# Patient Record
Sex: FEMALE | Employment: OTHER | URBAN - METROPOLITAN AREA
[De-identification: names, ages, dates, MRNs, and addresses within clinical notes are randomized per-mention and may not be internally consistent; named-entity substitution may affect disease eponyms.]

---

## 2019-03-28 ENCOUNTER — APPOINTMENT (OUTPATIENT)
Dept: GENERAL RADIOLOGY | Age: 64
End: 2019-03-28
Payer: COMMERCIAL

## 2019-03-28 ENCOUNTER — HOSPITAL ENCOUNTER (EMERGENCY)
Age: 64
Discharge: HOME | End: 2019-03-28
Payer: COMMERCIAL

## 2019-03-28 VITALS
RESPIRATION RATE: 15 BRPM | HEART RATE: 87 BPM | WEIGHT: 140 LBS | TEMPERATURE: 98.2 F | SYSTOLIC BLOOD PRESSURE: 95 MMHG | HEIGHT: 60 IN | DIASTOLIC BLOOD PRESSURE: 68 MMHG | OXYGEN SATURATION: 94 % | BODY MASS INDEX: 27.48 KG/M2

## 2019-03-28 DIAGNOSIS — N30.00 ACUTE CYSTITIS WITHOUT HEMATURIA: Primary | ICD-10-CM

## 2019-03-28 DIAGNOSIS — R17 ELEVATED BILIRUBIN: ICD-10-CM

## 2019-03-28 DIAGNOSIS — R11.0 NAUSEA: ICD-10-CM

## 2019-03-28 LAB
A/G RATIO: 0.9 (ref 1.1–2.2)
ALBUMIN SERPL-MCNC: 4.1 G/DL (ref 3.4–5)
ALP BLD-CCNC: 80 U/L (ref 40–129)
ALT SERPL-CCNC: 29 U/L (ref 10–40)
ANION GAP SERPL CALCULATED.3IONS-SCNC: 11 MMOL/L (ref 3–16)
AST SERPL-CCNC: 27 U/L (ref 15–37)
BACTERIA: ABNORMAL /HPF
BASOPHILS ABSOLUTE: 0.1 K/UL (ref 0–0.2)
BASOPHILS RELATIVE PERCENT: 0.5 %
BILIRUB SERPL-MCNC: 1.7 MG/DL (ref 0–1)
BILIRUBIN URINE: NEGATIVE
BLOOD, URINE: ABNORMAL
BUN BLDV-MCNC: 12 MG/DL (ref 7–20)
CALCIUM SERPL-MCNC: 9.1 MG/DL (ref 8.3–10.6)
CHLORIDE BLD-SCNC: 97 MMOL/L (ref 99–110)
CLARITY: ABNORMAL
CO2: 24 MMOL/L (ref 21–32)
COLOR: YELLOW
COMMENT UA: ABNORMAL
CREAT SERPL-MCNC: 0.7 MG/DL (ref 0.6–1.2)
EOSINOPHILS ABSOLUTE: 0.1 K/UL (ref 0–0.6)
EOSINOPHILS RELATIVE PERCENT: 0.8 %
EPITHELIAL CELLS, UA: 4 /HPF (ref 0–5)
GFR AFRICAN AMERICAN: >60
GFR NON-AFRICAN AMERICAN: >60
GLOBULIN: 4.5 G/DL
GLUCOSE BLD-MCNC: 103 MG/DL (ref 70–99)
GLUCOSE URINE: NEGATIVE MG/DL
HCT VFR BLD CALC: 39.5 % (ref 36–48)
HEMOGLOBIN: 12.9 G/DL (ref 12–16)
HYALINE CASTS: 9 /LPF (ref 0–8)
KETONES, URINE: NEGATIVE MG/DL
LEUKOCYTE ESTERASE, URINE: ABNORMAL
LIPASE: 41 U/L (ref 13–60)
LYMPHOCYTES ABSOLUTE: 2.4 K/UL (ref 1–5.1)
LYMPHOCYTES RELATIVE PERCENT: 20.9 %
MCH RBC QN AUTO: 30.8 PG (ref 26–34)
MCHC RBC AUTO-ENTMCNC: 32.7 G/DL (ref 31–36)
MCV RBC AUTO: 94.2 FL (ref 80–100)
MICROSCOPIC EXAMINATION: YES
MONOCYTES ABSOLUTE: 1 K/UL (ref 0–1.3)
MONOCYTES RELATIVE PERCENT: 8.9 %
NEUTROPHILS ABSOLUTE: 7.9 K/UL (ref 1.7–7.7)
NEUTROPHILS RELATIVE PERCENT: 68.9 %
NITRITE, URINE: NEGATIVE
PDW BLD-RTO: 12.5 % (ref 12.4–15.4)
PH UA: 6.5 (ref 5–8)
PLATELET # BLD: 182 K/UL (ref 135–450)
PMV BLD AUTO: 9.8 FL (ref 5–10.5)
POTASSIUM SERPL-SCNC: 4.3 MMOL/L (ref 3.5–5.1)
PROTEIN UA: 100 MG/DL
RBC # BLD: 4.19 M/UL (ref 4–5.2)
RBC UA: 14 /HPF (ref 0–4)
REASON FOR REJECTION: NORMAL
REJECTED TEST: NORMAL
SODIUM BLD-SCNC: 132 MMOL/L (ref 136–145)
SPECIFIC GRAVITY UA: 1 (ref 1–1.03)
TOTAL PROTEIN: 8.6 G/DL (ref 6.4–8.2)
URINE TYPE: ABNORMAL
UROBILINOGEN, URINE: 1 E.U./DL
WBC # BLD: 11.5 K/UL (ref 4–11)
WBC UA: 1112 /HPF (ref 0–5)

## 2019-03-28 PROCEDURE — 99283 EMERGENCY DEPT VISIT LOW MDM: CPT

## 2019-03-28 PROCEDURE — 80053 COMPREHEN METABOLIC PANEL: CPT

## 2019-03-28 PROCEDURE — 83690 ASSAY OF LIPASE: CPT

## 2019-03-28 PROCEDURE — 81001 URINALYSIS AUTO W/SCOPE: CPT

## 2019-03-28 PROCEDURE — 85025 COMPLETE CBC W/AUTO DIFF WBC: CPT

## 2019-03-28 PROCEDURE — 6370000000 HC RX 637 (ALT 250 FOR IP): Performed by: PHYSICIAN ASSISTANT

## 2019-03-28 PROCEDURE — 71046 X-RAY EXAM CHEST 2 VIEWS: CPT

## 2019-03-28 RX ORDER — SIMVASTATIN 40 MG
40 TABLET ORAL NIGHTLY
COMMUNITY

## 2019-03-28 RX ORDER — ONDANSETRON 4 MG/1
4 TABLET, ORALLY DISINTEGRATING ORAL ONCE
Status: COMPLETED | OUTPATIENT
Start: 2019-03-28 | End: 2019-03-28

## 2019-03-28 RX ORDER — IBUPROFEN 600 MG/1
600 TABLET ORAL ONCE
Status: COMPLETED | OUTPATIENT
Start: 2019-03-28 | End: 2019-03-28

## 2019-03-28 RX ORDER — LISINOPRIL 10 MG/1
10 TABLET ORAL DAILY
COMMUNITY

## 2019-03-28 RX ORDER — CEFUROXIME AXETIL 250 MG/1
250 TABLET ORAL 2 TIMES DAILY
Qty: 14 TABLET | Refills: 0 | Status: SHIPPED | OUTPATIENT
Start: 2019-03-28 | End: 2019-04-04

## 2019-03-28 RX ORDER — CEFUROXIME AXETIL 250 MG/1
250 TABLET ORAL 2 TIMES DAILY
Qty: 14 TABLET | Refills: 0 | Status: SHIPPED | OUTPATIENT
Start: 2019-03-28 | End: 2019-03-28 | Stop reason: SDUPTHER

## 2019-03-28 RX ORDER — OLANZAPINE 20 MG/1
20 TABLET ORAL NIGHTLY
COMMUNITY

## 2019-03-28 RX ORDER — ONDANSETRON 4 MG/1
4 TABLET, ORALLY DISINTEGRATING ORAL EVERY 8 HOURS PRN
Qty: 20 TABLET | Refills: 0 | Status: SHIPPED | OUTPATIENT
Start: 2019-03-28 | End: 2020-12-09 | Stop reason: ALTCHOICE

## 2019-03-28 RX ORDER — CEFUROXIME AXETIL 250 MG/1
500 TABLET ORAL ONCE
Status: COMPLETED | OUTPATIENT
Start: 2019-03-28 | End: 2019-03-28

## 2019-03-28 RX ORDER — ONDANSETRON 4 MG/1
4 TABLET, ORALLY DISINTEGRATING ORAL EVERY 8 HOURS PRN
Qty: 20 TABLET | Refills: 0 | Status: SHIPPED | OUTPATIENT
Start: 2019-03-28 | End: 2019-03-28 | Stop reason: SDUPTHER

## 2019-03-28 RX ADMIN — ONDANSETRON 4 MG: 4 TABLET, ORALLY DISINTEGRATING ORAL at 20:44

## 2019-03-28 RX ADMIN — IBUPROFEN 600 MG: 600 TABLET, FILM COATED ORAL at 20:44

## 2019-03-28 RX ADMIN — CEFUROXIME AXETIL 500 MG: 250 TABLET ORAL at 20:43

## 2019-03-28 SDOH — HEALTH STABILITY: MENTAL HEALTH: HOW OFTEN DO YOU HAVE A DRINK CONTAINING ALCOHOL?: NEVER

## 2019-03-28 ASSESSMENT — PAIN SCALES - GENERAL
PAINLEVEL_OUTOF10: 5
PAINLEVEL_OUTOF10: 5

## 2019-03-29 ASSESSMENT — ENCOUNTER SYMPTOMS
DIARRHEA: 0
ABDOMINAL PAIN: 0
SHORTNESS OF BREATH: 0
NAUSEA: 0
RHINORRHEA: 0
VOMITING: 0
COUGH: 1

## 2019-03-29 NOTE — ED PROVIDER NOTES
have fatty liver disease. PastMedical/Surgical History:      Diagnosis Date    Diabetes mellitus ([facility] [address]     Hyperlipidemia     Hypertension      History reviewed. No pertinent surgical history. Medications:  Discharge Medication List as of 3/28/2019 10:01 PM      CONTINUE these medications which have NOT CHANGED    Details   metFORMIN (GLUCOPHAGE) 500 MG tablet Take 500 mg by mouth 2 times daily (with meals)Historical Med      lisinopril (PRINIVIL;ZESTRIL) 5 MG tablet Take 5 mg by mouth dailyHistorical Med      simvastatin (ZOCOR) 40 MG tablet Take 40 mg by mouth nightlyHistorical Med      OLANZapine (ZYPREXA) 15 MG tablet Take 15 mg by mouth nightlyHistorical Med               Review of Systems:  Review of Systems   Constitutional: Negative for activity change, appetite change, chills and fever. HENT: Negative for congestion and rhinorrhea. Respiratory: Positive for cough. Negative for shortness of breath. Cardiovascular: Negative for chest pain. Gastrointestinal: Negative for abdominal pain, diarrhea, nausea and vomiting. Genitourinary: Positive for dysuria. Negative for difficulty urinating and hematuria. Positives and Pertinent negatives as per HPI. Except as noted above in the ROS, problem specific ROS was completed and is negative. Physical Exam:  Physical Exam   Constitutional: She is oriented to person, place, and time. She appears well-developed and well-nourished. HENT:   Head: Normocephalic and atraumatic. Right Ear: External ear normal.   Left Ear: External ear normal.   Nose: Nose normal.   Eyes: Right eye exhibits no discharge. Left eye exhibits no discharge. Neck: Normal range of motion. Neck supple. No tracheal deviation present. Cardiovascular: Normal rate, regular rhythm and normal heart sounds. No murmur heard. Pulmonary/Chest: Effort normal and breath sounds normal. No stridor. No respiratory distress. She has no wheezes. Abdominal: Soft.  Bowel sounds are Given 3/28/19 2044)   cefUROXime (CEFTIN) tablet 500 mg (500 mg Oral Given 3/28/19 2043)   ibuprofen (ADVIL;MOTRIN) tablet 600 mg (600 mg Oral Given 3/28/19 2044)       The patient presents to the emergency department today for concerns of dysuria. The family member is interpreting for the patient via the  phone as she states that the patient is hard of hearing however she seems to be communicating with her family member without difficulty. Patient is refusing to talk to me on the  phone. The patient has been complaining of burning with urination for the past 3 days. No reports of any urinary frequency, hematuria. No back pain or flank pain. No history of kidney stones. Patient is nauseous and did have one episode of emesis over the past 3 days, no bilious emesis, hematemesis or crepitus in emesis. She has not had any episodes of emesis recently. No fever or chills. No chest pain or shortness of breath. No diarrhea. She states that she's also had a dry cough over the past 3 days. No sputum, hemoptysis. When asked if the patient is having any abdominal pain as the family member states that the patient has had abdominal pain but this has been ongoing for \"a while\". She has been seen by her primary care physician for this and she was told that she did have fatty liver disease. Physical exam, her abdomen is benign. There is no tenderness, rebound or guarding. No CVA or flank tenderness. CBC does show a leukocytosis of 11.5 however she does have evidence of acute cystitis with large leukocytes 2+ arterial 112 white blood cells. Patient was given ibuprofen, Zofran and Ceftin orally in the ED which she tolerated without difficulty. Chest x-ray is negative. Her CMP does show a elevated bilirubin at 1.7 however patient has no abdominal tenderness, liver enzymes are normal, this is likely due to her fatty liver disease and he can have this followed up as an outpatient. She'll be given a prescription for Zofran and Ceftin. She is to follow-up with her primary care physician in 2-3 days reevaluation. She is return to the ED for any new or worsening symptoms. Patient was understanding and is agreeable with plan. Stable for discharge. My suspicion is low at this time for acute appendicitis, acute cholecystitis, cholangitis, pancreatitis, diverticulitis, perforated viscus, perforated ulcer, colitis, C. diff colitis, ischemic colitis, bowel obstruction, or other emergent etiology      The patient tolerated their visit well. I evaluated the patient. The physician was available for consultation as needed. The patient and / or the family were informed of the results of anytests, a time was given to answer questions, a plan was proposed and they agreed with plan. CLINICAL IMPRESSION:  1. Acute cystitis without hematuria    2. Nausea    3.  Elevated bilirubin        DISPOSITION Decision To Discharge 03/28/2019 09:30:46 PM      PATIENT REFERRED TO:  Your primary care physician    Schedule an appointment as soon as possible for a visit in 2 days      [facility] Emergency Department  [address] [place] [id number]    As needed, If symptoms worsen      DISCHARGE MEDICATIONS:  Discharge Medication List as of 3/28/2019 10:01 PM      START taking these medications    Details   cefUROXime (CEFTIN) 250 MG tablet Take 1 tablet by mouth 2 times daily for 7 days, Disp-14 tablet, R-0Print      ondansetron (ZOFRAN ODT) 4 MG disintegrating tablet Take 1 tablet by mouth every 8 hours as needed for Nausea, Disp-20 tablet, R-0Print             DISCONTINUED MEDICATIONS:  Discharge Medication List as of 3/28/2019 10:01 PM                 (Please note the MDM and HPI sections of this note were completed with a voice recognition program.  Efforts weremade to edit the dictations but occasionally words are mis-transcribed.)    Electronically signed, Thiago Stout PA-C, John Page PA-C  03/29/19 030

## 2019-03-29 NOTE — ED NOTES
Discharge instructions given via  [address] [facility] [address] Tabby King RN  03/28/19 3031

## 2019-04-11 ENCOUNTER — HOSPITAL ENCOUNTER (OUTPATIENT)
Dept: ULTRASOUND IMAGING | Age: 64
Discharge: HOME | End: 2019-04-11
Payer: COMMERCIAL

## 2019-04-11 DIAGNOSIS — R79.89 ELEVATED LFTS: ICD-10-CM

## 2019-04-11 DIAGNOSIS — R74.8 ELEVATED LIVER ENZYMES: ICD-10-CM

## 2019-04-11 PROCEDURE — 76705 ECHO EXAM OF ABDOMEN: CPT

## 2019-06-17 ENCOUNTER — HOSPITAL ENCOUNTER (OUTPATIENT)
Age: 64
Discharge: HOME | End: 2019-06-17
Payer: COMMERCIAL

## 2019-06-17 ENCOUNTER — HOSPITAL ENCOUNTER (OUTPATIENT)
Dept: GENERAL RADIOLOGY | Age: 64
Discharge: HOME | End: 2019-06-17
Payer: COMMERCIAL

## 2019-06-17 DIAGNOSIS — R05.3 CHRONIC COUGH: ICD-10-CM

## 2019-06-17 PROCEDURE — 71046 X-RAY EXAM CHEST 2 VIEWS: CPT

## 2019-07-26 ENCOUNTER — HOSPITAL ENCOUNTER (OUTPATIENT)
Dept: MAMMOGRAPHY | Age: 64
Discharge: HOME | End: 2019-07-31
Payer: COMMERCIAL

## 2019-07-26 DIAGNOSIS — Z12.31 VISIT FOR SCREENING MAMMOGRAM: ICD-10-CM

## 2019-07-26 PROCEDURE — 77067 SCR MAMMO BI INCL CAD: CPT

## 2019-11-21 ENCOUNTER — HOSPITAL ENCOUNTER (EMERGENCY)
Age: 64
Discharge: HOME | End: 2019-11-21
Payer: COMMERCIAL

## 2019-11-21 VITALS
SYSTOLIC BLOOD PRESSURE: 110 MMHG | TEMPERATURE: 98 F | RESPIRATION RATE: 16 BRPM | HEART RATE: 82 BPM | OXYGEN SATURATION: 96 % | DIASTOLIC BLOOD PRESSURE: 72 MMHG

## 2019-11-21 DIAGNOSIS — N39.0 URINARY TRACT INFECTION IN FEMALE: Primary | ICD-10-CM

## 2019-11-21 LAB
A/G RATIO: 1 (ref 1.1–2.2)
ALBUMIN SERPL-MCNC: 4.1 G/DL (ref 3.4–5)
ALP BLD-CCNC: 99 U/L (ref 40–129)
ALT SERPL-CCNC: 34 U/L (ref 10–40)
ANION GAP SERPL CALCULATED.3IONS-SCNC: 12 MMOL/L (ref 3–16)
AST SERPL-CCNC: 47 U/L (ref 15–37)
BACTERIA: ABNORMAL /HPF
BASOPHILS ABSOLUTE: 0 K/UL (ref 0–0.2)
BASOPHILS RELATIVE PERCENT: 0.5 %
BILIRUB SERPL-MCNC: 0.4 MG/DL (ref 0–1)
BILIRUBIN URINE: NEGATIVE
BLOOD, URINE: ABNORMAL
BUN BLDV-MCNC: 9 MG/DL (ref 7–20)
CALCIUM SERPL-MCNC: 9.6 MG/DL (ref 8.3–10.6)
CHLORIDE BLD-SCNC: 101 MMOL/L (ref 99–110)
CLARITY: ABNORMAL
CO2: 21 MMOL/L (ref 21–32)
COLOR: YELLOW
CREAT SERPL-MCNC: 0.8 MG/DL (ref 0.6–1.2)
EOSINOPHILS ABSOLUTE: 0.1 K/UL (ref 0–0.6)
EOSINOPHILS RELATIVE PERCENT: 1.6 %
EPITHELIAL CELLS, UA: 4 /HPF (ref 0–5)
GFR AFRICAN AMERICAN: >60
GFR NON-AFRICAN AMERICAN: >60
GLOBULIN: 4.3 G/DL
GLUCOSE BLD-MCNC: 87 MG/DL (ref 70–99)
GLUCOSE URINE: NEGATIVE MG/DL
HCT VFR BLD CALC: 35.8 % (ref 36–48)
HEMOGLOBIN: 12 G/DL (ref 12–16)
HYALINE CASTS: 6 /LPF (ref 0–8)
KETONES, URINE: NEGATIVE MG/DL
LEUKOCYTE ESTERASE, URINE: ABNORMAL
LIPASE: 41 U/L (ref 13–60)
LYMPHOCYTES ABSOLUTE: 2 K/UL (ref 1–5.1)
LYMPHOCYTES RELATIVE PERCENT: 22.7 %
MCH RBC QN AUTO: 31.4 PG (ref 26–34)
MCHC RBC AUTO-ENTMCNC: 33.6 G/DL (ref 31–36)
MCV RBC AUTO: 93.5 FL (ref 80–100)
MICROSCOPIC EXAMINATION: YES
MONOCYTES ABSOLUTE: 1 K/UL (ref 0–1.3)
MONOCYTES RELATIVE PERCENT: 10.8 %
NEUTROPHILS ABSOLUTE: 5.7 K/UL (ref 1.7–7.7)
NEUTROPHILS RELATIVE PERCENT: 64.4 %
NITRITE, URINE: NEGATIVE
PDW BLD-RTO: 12.2 % (ref 12.4–15.4)
PH UA: 6.5 (ref 5–8)
PLATELET # BLD: 138 K/UL (ref 135–450)
PMV BLD AUTO: 11.7 FL (ref 5–10.5)
POTASSIUM SERPL-SCNC: 4.6 MMOL/L (ref 3.5–5.1)
PROTEIN UA: ABNORMAL MG/DL
RBC # BLD: 3.83 M/UL (ref 4–5.2)
RBC UA: 3 /HPF (ref 0–4)
SODIUM BLD-SCNC: 134 MMOL/L (ref 136–145)
SPECIFIC GRAVITY UA: 1.01 (ref 1–1.03)
TOTAL PROTEIN: 8.4 G/DL (ref 6.4–8.2)
URINE REFLEX TO CULTURE: YES
URINE TYPE: ABNORMAL
UROBILINOGEN, URINE: 0.2 E.U./DL
WBC # BLD: 8.8 K/UL (ref 4–11)
WBC UA: 124 /HPF (ref 0–5)

## 2019-11-21 PROCEDURE — 85025 COMPLETE CBC W/AUTO DIFF WBC: CPT

## 2019-11-21 PROCEDURE — 87086 URINE CULTURE/COLONY COUNT: CPT

## 2019-11-21 PROCEDURE — 99283 EMERGENCY DEPT VISIT LOW MDM: CPT

## 2019-11-21 PROCEDURE — 81001 URINALYSIS AUTO W/SCOPE: CPT

## 2019-11-21 PROCEDURE — 80053 COMPREHEN METABOLIC PANEL: CPT

## 2019-11-21 PROCEDURE — 83690 ASSAY OF LIPASE: CPT

## 2019-11-21 RX ORDER — CEPHALEXIN 500 MG/1
500 CAPSULE ORAL 2 TIMES DAILY
Qty: 14 CAPSULE | Refills: 0 | Status: SHIPPED | OUTPATIENT
Start: 2019-11-21 | End: 2019-11-28

## 2019-11-21 RX ORDER — PHENAZOPYRIDINE HYDROCHLORIDE 200 MG/1
200 TABLET, FILM COATED ORAL 3 TIMES DAILY PRN
Qty: 6 TABLET | Refills: 0 | Status: SHIPPED | OUTPATIENT
Start: 2019-11-21 | End: 2019-11-24

## 2019-11-21 ASSESSMENT — ENCOUNTER SYMPTOMS
VOMITING: 0
NAUSEA: 0
ABDOMINAL PAIN: 1
SHORTNESS OF BREATH: 0
DIARRHEA: 0

## 2019-11-21 ASSESSMENT — PAIN SCALES - GENERAL: PAINLEVEL_OUTOF10: 10

## 2019-11-23 LAB — URINE CULTURE, ROUTINE: NORMAL

## 2020-01-30 ENCOUNTER — HOSPITAL ENCOUNTER (OUTPATIENT)
Dept: GENERAL RADIOLOGY | Age: 65
Discharge: HOME | End: 2020-01-30
Payer: COMMERCIAL

## 2020-01-30 ENCOUNTER — HOSPITAL ENCOUNTER (OUTPATIENT)
Age: 65
Discharge: HOME | End: 2020-01-30
Payer: COMMERCIAL

## 2020-01-30 PROCEDURE — 71046 X-RAY EXAM CHEST 2 VIEWS: CPT

## 2020-02-07 ENCOUNTER — HOSPITAL ENCOUNTER (OUTPATIENT)
Dept: ULTRASOUND IMAGING | Age: 65
Discharge: HOME | End: 2020-02-07
Payer: COMMERCIAL

## 2020-02-07 PROCEDURE — 76705 ECHO EXAM OF ABDOMEN: CPT

## 2020-12-09 ENCOUNTER — APPOINTMENT (OUTPATIENT)
Dept: GENERAL RADIOLOGY | Age: 65
DRG: 137 | End: 2020-12-09
Payer: MEDICAID

## 2020-12-09 ENCOUNTER — HOSPITAL ENCOUNTER (INPATIENT)
Age: 65
LOS: 2 days | Discharge: HOME | DRG: 137 | End: 2020-12-11
Attending: EMERGENCY MEDICINE | Admitting: INTERNAL MEDICINE
Payer: MEDICAID

## 2020-12-09 ENCOUNTER — APPOINTMENT (OUTPATIENT)
Dept: CT IMAGING | Age: 65
DRG: 137 | End: 2020-12-09
Payer: MEDICAID

## 2020-12-09 PROBLEM — R07.9 CHEST PAIN, RULE OUT ACUTE MYOCARDIAL INFARCTION: Status: ACTIVE | Noted: 2020-12-09

## 2020-12-09 LAB
ALBUMIN SERPL-MCNC: 4.1 G/DL (ref 3.4–5)
ALP BLD-CCNC: 47 U/L (ref 40–129)
ALT SERPL-CCNC: 11 U/L (ref 10–40)
ANION GAP SERPL CALCULATED.3IONS-SCNC: 8 MMOL/L (ref 3–16)
AST SERPL-CCNC: 16 U/L (ref 15–37)
BASOPHILS ABSOLUTE: 0 K/UL (ref 0–0.2)
BASOPHILS RELATIVE PERCENT: 0.4 %
BILIRUB SERPL-MCNC: 0.5 MG/DL (ref 0–1)
BILIRUBIN DIRECT: <0.2 MG/DL (ref 0–0.3)
BILIRUBIN URINE: NEGATIVE
BILIRUBIN, INDIRECT: NORMAL MG/DL (ref 0–1)
BLOOD, URINE: NEGATIVE
BUN BLDV-MCNC: 9 MG/DL (ref 7–20)
CALCIUM SERPL-MCNC: 9.4 MG/DL (ref 8.3–10.6)
CHLORIDE BLD-SCNC: 98 MMOL/L (ref 99–110)
CLARITY: CLEAR
CO2: 23 MMOL/L (ref 21–32)
COLOR: YELLOW
CREAT SERPL-MCNC: 0.9 MG/DL (ref 0.6–1.2)
EKG ATRIAL RATE: 99 BPM
EKG DIAGNOSIS: NORMAL
EKG P AXIS: 15 DEGREES
EKG P-R INTERVAL: 158 MS
EKG Q-T INTERVAL: 336 MS
EKG QRS DURATION: 84 MS
EKG QTC CALCULATION (BAZETT): 431 MS
EKG R AXIS: 13 DEGREES
EKG T AXIS: 80 DEGREES
EKG VENTRICULAR RATE: 99 BPM
EOSINOPHILS ABSOLUTE: 0 K/UL (ref 0–0.6)
EOSINOPHILS RELATIVE PERCENT: 0 %
GFR AFRICAN AMERICAN: >60
GFR NON-AFRICAN AMERICAN: >60
GLUCOSE BLD-MCNC: 110 MG/DL (ref 70–99)
GLUCOSE BLD-MCNC: 94 MG/DL (ref 70–99)
GLUCOSE URINE: NEGATIVE MG/DL
HCT VFR BLD CALC: 36.2 % (ref 36–48)
HEMOGLOBIN: 12 G/DL (ref 12–16)
KETONES, URINE: NEGATIVE MG/DL
LACTIC ACID: 1.1 MMOL/L (ref 0.4–2)
LEUKOCYTE ESTERASE, URINE: NEGATIVE
LIPASE: 29 U/L (ref 13–60)
LYMPHOCYTES ABSOLUTE: 0.8 K/UL (ref 1–5.1)
LYMPHOCYTES RELATIVE PERCENT: 14.2 %
MCH RBC QN AUTO: 31.9 PG (ref 26–34)
MCHC RBC AUTO-ENTMCNC: 33.2 G/DL (ref 31–36)
MCV RBC AUTO: 96 FL (ref 80–100)
MICROSCOPIC EXAMINATION: ABNORMAL
MONOCYTES ABSOLUTE: 0.7 K/UL (ref 0–1.3)
MONOCYTES RELATIVE PERCENT: 12.8 %
NEUTROPHILS ABSOLUTE: 4.2 K/UL (ref 1.7–7.7)
NEUTROPHILS RELATIVE PERCENT: 72.6 %
NITRITE, URINE: NEGATIVE
OVALOCYTES: ABNORMAL
PDW BLD-RTO: 12.1 % (ref 12.4–15.4)
PERFORMED ON: NORMAL
PH UA: 8.5 (ref 5–8)
PLATELET # BLD: 90 K/UL (ref 135–450)
PLATELET SLIDE REVIEW: ABNORMAL
PMV BLD AUTO: 11.8 FL (ref 5–10.5)
POTASSIUM SERPL-SCNC: 4 MMOL/L (ref 3.5–5.1)
POTASSIUM SERPL-SCNC: 5.6 MMOL/L (ref 3.5–5.1)
PROTEIN UA: NEGATIVE MG/DL
RBC # BLD: 3.77 M/UL (ref 4–5.2)
SLIDE REVIEW: ABNORMAL
SODIUM BLD-SCNC: 129 MMOL/L (ref 136–145)
SPECIFIC GRAVITY UA: >1.03 (ref 1–1.03)
TOTAL PROTEIN: 7.6 G/DL (ref 6.4–8.2)
TROPONIN: <0.01 NG/ML
TROPONIN: <0.01 NG/ML
URINE REFLEX TO CULTURE: ABNORMAL
URINE TYPE: ABNORMAL
UROBILINOGEN, URINE: 1 E.U./DL
WBC # BLD: 5.7 K/UL (ref 4–11)

## 2020-12-09 PROCEDURE — 80076 HEPATIC FUNCTION PANEL: CPT

## 2020-12-09 PROCEDURE — 99283 EMERGENCY DEPT VISIT LOW MDM: CPT

## 2020-12-09 PROCEDURE — 87040 BLOOD CULTURE FOR BACTERIA: CPT

## 2020-12-09 PROCEDURE — 2580000003 HC RX 258: Performed by: EMERGENCY MEDICINE

## 2020-12-09 PROCEDURE — 71046 X-RAY EXAM CHEST 2 VIEWS: CPT

## 2020-12-09 PROCEDURE — 84132 ASSAY OF SERUM POTASSIUM: CPT

## 2020-12-09 PROCEDURE — 93010 ELECTROCARDIOGRAM REPORT: CPT | Performed by: INTERNAL MEDICINE

## 2020-12-09 PROCEDURE — 6370000000 HC RX 637 (ALT 250 FOR IP): Performed by: INTERNAL MEDICINE

## 2020-12-09 PROCEDURE — 74177 CT ABD & PELVIS W/CONTRAST: CPT

## 2020-12-09 PROCEDURE — 96361 HYDRATE IV INFUSION ADD-ON: CPT

## 2020-12-09 PROCEDURE — 96360 HYDRATION IV INFUSION INIT: CPT

## 2020-12-09 PROCEDURE — 80048 BASIC METABOLIC PNL TOTAL CA: CPT

## 2020-12-09 PROCEDURE — 83690 ASSAY OF LIPASE: CPT

## 2020-12-09 PROCEDURE — 36415 COLL VENOUS BLD VENIPUNCTURE: CPT

## 2020-12-09 PROCEDURE — U0003 INFECTIOUS AGENT DETECTION BY NUCLEIC ACID (DNA OR RNA); SEVERE ACUTE RESPIRATORY SYNDROME CORONAVIRUS 2 (SARS-COV-2) (CORONAVIRUS DISEASE [COVID-19]), AMPLIFIED PROBE TECHNIQUE, MAKING USE OF HIGH THROUGHPUT TECHNOLOGIES AS DESCRIBED BY CMS-2020-01-R: HCPCS

## 2020-12-09 PROCEDURE — 1200000000 HC SEMI PRIVATE

## 2020-12-09 PROCEDURE — 6360000004 HC RX CONTRAST MEDICATION: Performed by: EMERGENCY MEDICINE

## 2020-12-09 PROCEDURE — 83605 ASSAY OF LACTIC ACID: CPT

## 2020-12-09 PROCEDURE — 93005 ELECTROCARDIOGRAM TRACING: CPT | Performed by: EMERGENCY MEDICINE

## 2020-12-09 PROCEDURE — 81003 URINALYSIS AUTO W/O SCOPE: CPT

## 2020-12-09 PROCEDURE — 2580000003 HC RX 258: Performed by: INTERNAL MEDICINE

## 2020-12-09 PROCEDURE — 85025 COMPLETE CBC W/AUTO DIFF WBC: CPT

## 2020-12-09 PROCEDURE — 84484 ASSAY OF TROPONIN QUANT: CPT

## 2020-12-09 RX ORDER — 0.9 % SODIUM CHLORIDE 0.9 %
30 INTRAVENOUS SOLUTION INTRAVENOUS ONCE
Status: COMPLETED | OUTPATIENT
Start: 2020-12-09 | End: 2020-12-09

## 2020-12-09 RX ORDER — ACETAMINOPHEN 650 MG/1
650 SUPPOSITORY RECTAL EVERY 6 HOURS PRN
Status: DISCONTINUED | OUTPATIENT
Start: 2020-12-09 | End: 2020-12-11 | Stop reason: HOSPADM

## 2020-12-09 RX ORDER — CETIRIZINE HYDROCHLORIDE 10 MG/1
10 TABLET ORAL DAILY PRN
COMMUNITY

## 2020-12-09 RX ORDER — NICOTINE POLACRILEX 4 MG
15 LOZENGE BUCCAL PRN
Status: DISCONTINUED | OUTPATIENT
Start: 2020-12-09 | End: 2020-12-11 | Stop reason: HOSPADM

## 2020-12-09 RX ORDER — POLYETHYLENE GLYCOL 3350 17 G/17G
17 POWDER, FOR SOLUTION ORAL DAILY PRN
Status: DISCONTINUED | OUTPATIENT
Start: 2020-12-09 | End: 2020-12-11 | Stop reason: HOSPADM

## 2020-12-09 RX ORDER — SODIUM CHLORIDE 0.9 % (FLUSH) 0.9 %
10 SYRINGE (ML) INJECTION PRN
Status: DISCONTINUED | OUTPATIENT
Start: 2020-12-09 | End: 2020-12-11 | Stop reason: HOSPADM

## 2020-12-09 RX ORDER — GUAIFENESIN/DEXTROMETHORPHAN 100-10MG/5
5 SYRUP ORAL EVERY 4 HOURS PRN
Status: DISCONTINUED | OUTPATIENT
Start: 2020-12-09 | End: 2020-12-11 | Stop reason: HOSPADM

## 2020-12-09 RX ORDER — INSULIN LISPRO 100 [IU]/ML
0-6 INJECTION, SOLUTION INTRAVENOUS; SUBCUTANEOUS NIGHTLY
Status: DISCONTINUED | OUTPATIENT
Start: 2020-12-09 | End: 2020-12-11 | Stop reason: HOSPADM

## 2020-12-09 RX ORDER — ATORVASTATIN CALCIUM 10 MG/1
20 TABLET, FILM COATED ORAL NIGHTLY
Status: DISCONTINUED | OUTPATIENT
Start: 2020-12-09 | End: 2020-12-11 | Stop reason: HOSPADM

## 2020-12-09 RX ORDER — POTASSIUM CHLORIDE 7.45 MG/ML
10 INJECTION INTRAVENOUS PRN
Status: DISCONTINUED | OUTPATIENT
Start: 2020-12-09 | End: 2020-12-11 | Stop reason: HOSPADM

## 2020-12-09 RX ORDER — TRAZODONE HYDROCHLORIDE 50 MG/1
25 TABLET ORAL NIGHTLY PRN
COMMUNITY

## 2020-12-09 RX ORDER — FAMOTIDINE 20 MG/1
20 TABLET, FILM COATED ORAL DAILY
Status: DISCONTINUED | OUTPATIENT
Start: 2020-12-09 | End: 2020-12-11 | Stop reason: HOSPADM

## 2020-12-09 RX ORDER — SODIUM CHLORIDE 0.9 % (FLUSH) 0.9 %
10 SYRINGE (ML) INJECTION EVERY 12 HOURS SCHEDULED
Status: DISCONTINUED | OUTPATIENT
Start: 2020-12-09 | End: 2020-12-11 | Stop reason: HOSPADM

## 2020-12-09 RX ORDER — ONDANSETRON 2 MG/ML
4 INJECTION INTRAMUSCULAR; INTRAVENOUS EVERY 6 HOURS PRN
Status: DISCONTINUED | OUTPATIENT
Start: 2020-12-09 | End: 2020-12-11 | Stop reason: HOSPADM

## 2020-12-09 RX ORDER — DEXTROSE MONOHYDRATE 50 MG/ML
100 INJECTION, SOLUTION INTRAVENOUS PRN
Status: DISCONTINUED | OUTPATIENT
Start: 2020-12-09 | End: 2020-12-11 | Stop reason: HOSPADM

## 2020-12-09 RX ORDER — DORZOLAMIDE HCL 20 MG/ML
1 SOLUTION/ DROPS OPHTHALMIC 3 TIMES DAILY
COMMUNITY

## 2020-12-09 RX ORDER — PROMETHAZINE HYDROCHLORIDE 25 MG/1
12.5 TABLET ORAL EVERY 6 HOURS PRN
Status: DISCONTINUED | OUTPATIENT
Start: 2020-12-09 | End: 2020-12-11 | Stop reason: HOSPADM

## 2020-12-09 RX ORDER — HYDROXYZINE HYDROCHLORIDE 25 MG/1
25 TABLET, FILM COATED ORAL 3 TIMES DAILY PRN
COMMUNITY

## 2020-12-09 RX ORDER — ASPIRIN 81 MG/1
81 TABLET, CHEWABLE ORAL DAILY
Status: DISCONTINUED | OUTPATIENT
Start: 2020-12-10 | End: 2020-12-11 | Stop reason: HOSPADM

## 2020-12-09 RX ORDER — OLANZAPINE 5 MG/1
15 TABLET ORAL NIGHTLY
Status: DISCONTINUED | OUTPATIENT
Start: 2020-12-09 | End: 2020-12-11 | Stop reason: HOSPADM

## 2020-12-09 RX ORDER — DEXTROSE MONOHYDRATE 25 G/50ML
12.5 INJECTION, SOLUTION INTRAVENOUS PRN
Status: DISCONTINUED | OUTPATIENT
Start: 2020-12-09 | End: 2020-12-11 | Stop reason: HOSPADM

## 2020-12-09 RX ORDER — POTASSIUM CHLORIDE 20 MEQ/1
40 TABLET, EXTENDED RELEASE ORAL PRN
Status: DISCONTINUED | OUTPATIENT
Start: 2020-12-09 | End: 2020-12-11 | Stop reason: HOSPADM

## 2020-12-09 RX ORDER — MAGNESIUM SULFATE IN WATER 40 MG/ML
2 INJECTION, SOLUTION INTRAVENOUS PRN
Status: DISCONTINUED | OUTPATIENT
Start: 2020-12-09 | End: 2020-12-11 | Stop reason: HOSPADM

## 2020-12-09 RX ORDER — OMEPRAZOLE 40 MG/1
40 CAPSULE, DELAYED RELEASE ORAL DAILY
COMMUNITY

## 2020-12-09 RX ORDER — ACETAMINOPHEN 325 MG/1
650 TABLET ORAL EVERY 6 HOURS PRN
Status: DISCONTINUED | OUTPATIENT
Start: 2020-12-09 | End: 2020-12-11 | Stop reason: HOSPADM

## 2020-12-09 RX ORDER — INSULIN LISPRO 100 [IU]/ML
0-12 INJECTION, SOLUTION INTRAVENOUS; SUBCUTANEOUS
Status: DISCONTINUED | OUTPATIENT
Start: 2020-12-09 | End: 2020-12-11 | Stop reason: HOSPADM

## 2020-12-09 RX ORDER — DIVALPROEX SODIUM 250 MG/1
500 TABLET, DELAYED RELEASE ORAL DAILY
COMMUNITY

## 2020-12-09 RX ADMIN — OLANZAPINE 15 MG: 5 TABLET, FILM COATED ORAL at 21:57

## 2020-12-09 RX ADMIN — Medication 10 ML: at 21:58

## 2020-12-09 RX ADMIN — ATORVASTATIN CALCIUM 20 MG: 10 TABLET, FILM COATED ORAL at 21:57

## 2020-12-09 RX ADMIN — IOPAMIDOL 75 ML: 755 INJECTION, SOLUTION INTRAVENOUS at 07:31

## 2020-12-09 RX ADMIN — SODIUM CHLORIDE 1905 ML: 9 INJECTION, SOLUTION INTRAVENOUS at 08:12

## 2020-12-09 ASSESSMENT — PAIN SCALES - GENERAL
PAINLEVEL_OUTOF10: 0
PAINLEVEL_OUTOF10: 7

## 2020-12-09 ASSESSMENT — HEART SCORE: ECG: 1

## 2020-12-09 NOTE — ED NOTES
Pharmacy Medication History Note      List of current medications patient is taking is complete. Source of information: [facility]    Changes made to medication list:  Medications flagged for removal (include reason, ex. noncompliance):  none    Medications removed (include reason, ex. therapy complete or physician discontinued): Ondansetron- therapy completed    Medications added/doses adjusted:  Cetirizine 10 mg daily  Trazodone 25 mg HS prn  Dorzolamide 2% 1 drop both eyes TID  Depakote 500 mg daily  Spiriva 2.5 mg/act 2 puff daily    Other notes (ex. Recent course of antibiotics, Coumadin dosing):  Denies use of other OTC or herbal medications. Last dose times updated. Nancie Warren, PharmD  ED Pharmacist [id number]  12/9/2020    No current facility-administered medications on file prior to encounter.         Current Outpatient Medications on File Prior to Encounter   Medication Sig Dispense Refill    cetirizine (ZYRTEC) 10 MG tablet Take 10 mg by mouth daily as needed for Allergies      traZODone (DESYREL) 50 MG tablet Take 25 mg by mouth nightly as needed for Sleep      dorzolamide (TRUSOPT) 2 % ophthalmic solution Place 1 drop into both eyes 3 times daily      divalproex (DEPAKOTE) 250 MG DR tablet Take 500 mg by mouth daily      hydrOXYzine (ATARAX) 25 MG tablet Take 25 mg by mouth 3 times daily as needed for Itching      omeprazole (PRILOSEC) 40 MG delayed release capsule Take 40 mg by mouth daily      tiotropium (SPIRIVA RESPIMAT) 2.5 MCG/ACT AERS inhaler Inhale 2 puffs into the lungs daily      metFORMIN (GLUCOPHAGE) 1000 MG tablet Take 1,000 mg by mouth 2 times daily (with meals)       lisinopril (PRINIVIL;ZESTRIL) 10 MG tablet Take 10 mg by mouth daily       simvastatin (ZOCOR) 40 MG tablet Take 40 mg by mouth nightly      OLANZapine (ZYPREXA) 20 MG tablet Take 20 mg by mouth nightly       [DISCONTINUED] ondansetron (ZOFRAN ODT) 4 MG disintegrating tablet Take 1 tablet by mouth every 8 hours as needed for Nausea 20 tablet 0

## 2020-12-09 NOTE — ED PROVIDER NOTES
[facility] Emergency Department      Pt Name: Sarah Lugo  MRN: [id number]  [place] 1955  Date of evaluation: 12/9/2020  Provider: Fortunato Carlisle MD  CHIEF COMPLAINT  Chief Complaint   Patient presents with    Chest Pain     pt with cp, vomiting, and coughing since yesterday. HPI  Sarah Lugo is a 63 y.o. female who presents because of chest discomfort. She is very hard of hearing, speaks Welsh, and is unable to provide any history. It is reported that she has had some vomiting and coughing that started yesterday and her chest hurts. I tried to contact family listed in patient demographics but did not receive a call back and left a message. I attempted to use the video Welsh  and she would not communicate with him. REVIEW OF SYSTEMS:  See HPI for further details. Remainder of review of systems limited by patient ability to provide history. Nursing notes reviewed. PAST MEDICAL HISTORY  Past Medical History:   Diagnosis Date    Diabetes mellitus ([facility] [address]     Hyperlipidemia     Hypertension      SURGICAL HISTORY  History reviewed. No pertinent surgical history. MEDICATIONS:  No current facility-administered medications on file prior to encounter. Current Outpatient Medications on File Prior to Encounter   Medication Sig Dispense Refill    metFORMIN (GLUCOPHAGE) 500 MG tablet Take 500 mg by mouth 2 times daily (with meals)      lisinopril (PRINIVIL;ZESTRIL) 5 MG tablet Take 5 mg by mouth daily      simvastatin (ZOCOR) 40 MG tablet Take 40 mg by mouth nightly      OLANZapine (ZYPREXA) 15 MG tablet Take 15 mg by mouth nightly      ondansetron (ZOFRAN ODT) 4 MG disintegrating tablet Take 1 tablet by mouth every 8 hours as needed for Nausea 20 tablet 0     ALLERGIES  Patient has no known allergies. FAMILY HISTORY  History reviewed. No pertinent family history.   SOCIAL HISTORY:  Social History     Tobacco Use    Smoking status: Never Smoker    Smokeless tobacco: Never Used Substance Use Topics    Alcohol use: Never     Frequency: Never    Drug use: Not on file     IMMUNIZATIONS:  Noncontributory    PHYSICAL EXAM  VITAL SIGNS:  Blood pressure 118/83, pulse 86, temperature 99.2 °F (37.3 °C), temperature source Temporal, resp. rate 18, SpO2 98 %. Constitutional:  63 y.o. female drowsy, awakens but does not speak, occasional cough  HENT:  Atraumatic, mucous membranes moist  Eyes:   Conjunctiva clear, no icterus  Neck:  Supple, no JVD, no signs of injury  Cardiovascular:  Regular, no rubs  Thorax & Lungs:  No accessory muscle usage, clear  Abdomen:  Soft, non distended, bowel sounds present, epigastric tenderness  Back:  No deformity  Genitalia:  Deferred  Rectal:  Deferred  Extremities:  No cyanosis, no edema  Skin:  Warm, dry  Neurologic:  Drowsy, spontaneously moves extremities, does not speak  Psychiatric: unassessable    DIAGNOSTIC RESULTS:  Labs resulted at the time of this note reviewed.   Labs Reviewed   BASIC METABOLIC PANEL - Abnormal; Notable for the following components:       Result Value    Sodium 129 (*)     Potassium 5.6 (*)     Chloride 98 (*)     Glucose 110 (*)     All other components within normal limits    Narrative:     Performed at:  [facility] [address], [place], [facility] [address]   Phone [phone]   CBC WITH AUTO DIFFERENTIAL - Abnormal; Notable for the following components:    RBC 3.77 (*)     RDW 12.1 (*)     Platelets 90 (*)     MPV 11.8 (*)     Lymphocytes Absolute 0.8 (*)     Ovalocytes Occasional (*)     All other components within normal limits    Narrative:     Performed at:  [facility]  [address] [facility], [facility] [address]   Phone [phone]   URINE RT REFLEX TO CULTURE - Abnormal; Notable for the following components:    pH, UA 8.5 (*)     All other components within normal limits    Narrative:     Performed at:  [facility]  [address],

## 2020-12-09 NOTE — H&P
(with meals)      lisinopril (PRINIVIL;ZESTRIL) 5 MG tablet Take 5 mg by mouth daily      simvastatin (ZOCOR) 40 MG tablet Take 40 mg by mouth nightly      OLANZapine (ZYPREXA) 15 MG tablet Take 15 mg by mouth nightly      ondansetron (ZOFRAN ODT) 4 MG disintegrating tablet Take 1 tablet by mouth every 8 hours as needed for Nausea 20 tablet 0       Allergies:  No Known Allergies     Social History:  Patient Lives at home with family. reports that she has never smoked. She has never used smokeless tobacco. She reports that she does not drink alcohol. Family History:  family history is not on file. Physical Exam:  /83   Pulse 86   Temp 99.2 °F (37.3 °C) (Temporal)   Resp 18   Wt 140 lb (63.5 kg)   SpO2 98%   BMI 27.34 kg/m²     General appearance: Shot Yann female, anxious appearing, not in distress  Eyes: Sclera clear, pupils equal  ENT: Moist mucus membranes, no thrush. Trachea midline. Cardiovascular: Regular rhythm, normal S1, S2. No murmur, gallop, rub. No edema in lower extremities  Respiratory: Decreased bibasilar breath, no wheeze, good inspiratory effort  Gastrointestinal: Abdomen soft, non-tender, not distended, normal bowel sounds  Musculoskeletal: No cyanosis in digits, neck supple  Neurology: Patient not following commands.   Unable to do a complete exam.  Psychiatry: Anxious appearing, not agitated  Skin: Warm, dry, normal turgor, no rash  Brisk capillary refill, peripheral pulses palpable     Labs:    CBC:   Lab Results   Component Value Date    WBC 5.7 12/09/2020    RBC 3.77 12/09/2020    HGB 12.0 12/09/2020    HCT 36.2 12/09/2020    MCV 96.0 12/09/2020    MCH 31.9 12/09/2020    MCHC 33.2 12/09/2020    RDW 12.1 12/09/2020    PLT 90 12/09/2020    MPV 11.8 12/09/2020     BMP:    Lab Results   Component Value Date     12/09/2020    K 4.0 12/09/2020    CL 98 12/09/2020    CO2 23 12/09/2020    BUN 9 12/09/2020    CREATININE 0.9 12/09/2020    CALCIUM 9.4 12/09/2020    GFRAA >60 12/09/2020    LABGLOM >60 12/09/2020    GLUCOSE 110 12/09/2020     CT ABDOMEN PELVIS W IV CONTRAST Additional Contrast? None   Final Result   1. No acute abnormality. XR CHEST (2 VW)   Final Result   No evidence of acute process. Chest Xray: no acute process. EKG:  Non-specific ST-T wave changes noted. I visualized CXR images and EKG strips    Discussed case  with ED provider. Problem List  Active Problems:    * No active hospital problems. *  Resolved Problems:    * No resolved hospital problems. *        Assessment/Plan:     Chest pain:  Angina versus pleuritic vs atypical chest pain. patient reported chest pain in ED. Initial troponin level in ED is negative. EKG showed nonspecific ST-T wave changes. Discussed with ED provider. Will trend troponin level x2 more. Cardiology consulted. 2D echocardiogram.      Nausea, vomiting: Suspected COVID-19:  Test report pending. Droplet plus precautions. Stable respiratory status on room air. No indication for steroids, IV remdesivir or convalescent plasma at this time. As needed antiemetics. Diet advance as tolerated. Hyperkalemia:  Serum potassium level of 5.9 on admission. Corrected to 4.0. Mild hyponatremia:  Serum sodium level 129. Likely due to decreased p.o. intake. Maintenance IV fluids. Will monitor on BMP at 4 PM.    DVT prophylaxis Lovenox subcu daily  Code status full code  Diet cardiac, low carb general diet  IV access peripheral IV  Collier Catheter no    Admit as inpatient. I anticipate hospitalization spanning more than two midnights for investigation and treatment of the above medically necessary diagnoses. Please note that some part of this chart was generated using Dragon dictation software. Although every effort was made to ensure the accuracy of this automated transcription, some errors in transcription may have occurred inadvertently.  If you may need any clarification, please do not hesitate to contact

## 2020-12-09 NOTE — ED NOTES
RN attempt 3 times to replace pt. IV, pt. Continues to pull away and scream with attempts.      Luis M Huber RN  12/09/20 8319

## 2020-12-09 NOTE — ED NOTES
RN in pt. Room, pt. Had pulled off all leads and gown, also removed IV, bleeding controlled. Dr. Jersey Pacheco in room to evaluate pt. Pt. Placed in new gown and clean sheets, bed alarm in place.       Alejandra Beatty RN  12/09/20 7982

## 2020-12-10 PROBLEM — D61.818 PANCYTOPENIA (HCC): Status: ACTIVE | Noted: 2020-12-10

## 2020-12-10 PROBLEM — U07.1 COVID-19: Status: ACTIVE | Noted: 2020-12-10

## 2020-12-10 PROBLEM — F09 COGNITIVE DYSFUNCTION: Status: ACTIVE | Noted: 2020-12-10

## 2020-12-10 PROBLEM — R07.9 CHEST PAIN, RULE OUT ACUTE MYOCARDIAL INFARCTION: Status: RESOLVED | Noted: 2020-12-09 | Resolved: 2020-12-10

## 2020-12-10 LAB
A/G RATIO: 1 (ref 1.1–2.2)
ALBUMIN SERPL-MCNC: 3.6 G/DL (ref 3.4–5)
ALP BLD-CCNC: 43 U/L (ref 40–129)
ALT SERPL-CCNC: 10 U/L (ref 10–40)
ANION GAP SERPL CALCULATED.3IONS-SCNC: 9 MMOL/L (ref 3–16)
APTT: 44.8 SEC (ref 24.2–36.2)
AST SERPL-CCNC: 20 U/L (ref 15–37)
BASOPHILS ABSOLUTE: 0 K/UL (ref 0–0.2)
BASOPHILS RELATIVE PERCENT: 0.3 %
BILIRUB SERPL-MCNC: 0.5 MG/DL (ref 0–1)
BUN BLDV-MCNC: 10 MG/DL (ref 7–20)
C-REACTIVE PROTEIN: 33.7 MG/L (ref 0–5.1)
CALCIUM SERPL-MCNC: 8.6 MG/DL (ref 8.3–10.6)
CHLORIDE BLD-SCNC: 101 MMOL/L (ref 99–110)
CHOLESTEROL, TOTAL: 116 MG/DL (ref 0–199)
CO2: 22 MMOL/L (ref 21–32)
CREAT SERPL-MCNC: 0.8 MG/DL (ref 0.6–1.2)
D DIMER: 490 NG/ML DDU (ref 0–229)
EKG ATRIAL RATE: 91 BPM
EKG DIAGNOSIS: NORMAL
EKG P AXIS: 54 DEGREES
EKG P-R INTERVAL: 166 MS
EKG Q-T INTERVAL: 358 MS
EKG QRS DURATION: 90 MS
EKG QTC CALCULATION (BAZETT): 440 MS
EKG R AXIS: -12 DEGREES
EKG T AXIS: 50 DEGREES
EKG VENTRICULAR RATE: 91 BPM
EOSINOPHILS ABSOLUTE: 0 K/UL (ref 0–0.6)
EOSINOPHILS RELATIVE PERCENT: 0 %
FERRITIN: 227 NG/ML (ref 15–150)
FIBRINOGEN: 269 MG/DL (ref 200–397)
GFR AFRICAN AMERICAN: >60
GFR NON-AFRICAN AMERICAN: >60
GLOBULIN: 3.5 G/DL
GLUCOSE BLD-MCNC: 107 MG/DL (ref 70–99)
GLUCOSE BLD-MCNC: 97 MG/DL (ref 70–99)
GLUCOSE BLD-MCNC: 98 MG/DL (ref 70–99)
GLUCOSE BLD-MCNC: 99 MG/DL (ref 70–99)
HCT VFR BLD CALC: 30.4 % (ref 36–48)
HDLC SERPL-MCNC: 31 MG/DL (ref 40–60)
HEMOGLOBIN: 10.2 G/DL (ref 12–16)
INR BLD: 1.25 (ref 0.86–1.14)
LACTATE DEHYDROGENASE: 163 U/L (ref 100–190)
LACTIC ACID: 0.7 MMOL/L (ref 0.4–2)
LDL CHOLESTEROL CALCULATED: 63 MG/DL
LYMPHOCYTES ABSOLUTE: 0.8 K/UL (ref 1–5.1)
LYMPHOCYTES RELATIVE PERCENT: 23.7 %
MCH RBC QN AUTO: 32.1 PG (ref 26–34)
MCHC RBC AUTO-ENTMCNC: 33.5 G/DL (ref 31–36)
MCV RBC AUTO: 95.9 FL (ref 80–100)
MONOCYTES ABSOLUTE: 0.6 K/UL (ref 0–1.3)
MONOCYTES RELATIVE PERCENT: 18.7 %
NEUTROPHILS ABSOLUTE: 1.8 K/UL (ref 1.7–7.7)
NEUTROPHILS RELATIVE PERCENT: 57.3 %
PDW BLD-RTO: 11.8 % (ref 12.4–15.4)
PERFORMED ON: ABNORMAL
PERFORMED ON: NORMAL
PERFORMED ON: NORMAL
PLATELET # BLD: 81 K/UL (ref 135–450)
PMV BLD AUTO: 9.7 FL (ref 5–10.5)
POTASSIUM REFLEX MAGNESIUM: 3.7 MMOL/L (ref 3.5–5.1)
PRO-BNP: 142 PG/ML (ref 0–124)
PROTHROMBIN TIME: 14.5 SEC (ref 10–13.2)
RBC # BLD: 3.17 M/UL (ref 4–5.2)
SARS-COV-2, PCR: DETECTED
SODIUM BLD-SCNC: 132 MMOL/L (ref 136–145)
TOTAL PROTEIN: 7.1 G/DL (ref 6.4–8.2)
TRIGL SERPL-MCNC: 108 MG/DL (ref 0–150)
VITAMIN D 25-HYDROXY: 14.5 NG/ML
VLDLC SERPL CALC-MCNC: 22 MG/DL
WBC # BLD: 3.2 K/UL (ref 4–11)

## 2020-12-10 PROCEDURE — 85610 PROTHROMBIN TIME: CPT

## 2020-12-10 PROCEDURE — 85730 THROMBOPLASTIN TIME PARTIAL: CPT

## 2020-12-10 PROCEDURE — 86140 C-REACTIVE PROTEIN: CPT

## 2020-12-10 PROCEDURE — 97161 PT EVAL LOW COMPLEX 20 MIN: CPT

## 2020-12-10 PROCEDURE — 80061 LIPID PANEL: CPT

## 2020-12-10 PROCEDURE — 83880 ASSAY OF NATRIURETIC PEPTIDE: CPT

## 2020-12-10 PROCEDURE — 97535 SELF CARE MNGMENT TRAINING: CPT

## 2020-12-10 PROCEDURE — 85384 FIBRINOGEN ACTIVITY: CPT

## 2020-12-10 PROCEDURE — 85379 FIBRIN DEGRADATION QUANT: CPT

## 2020-12-10 PROCEDURE — 99254 IP/OBS CNSLTJ NEW/EST MOD 60: CPT | Performed by: INTERNAL MEDICINE

## 2020-12-10 PROCEDURE — 85025 COMPLETE CBC W/AUTO DIFF WBC: CPT

## 2020-12-10 PROCEDURE — 80053 COMPREHEN METABOLIC PANEL: CPT

## 2020-12-10 PROCEDURE — 93005 ELECTROCARDIOGRAM TRACING: CPT | Performed by: INTERNAL MEDICINE

## 2020-12-10 PROCEDURE — 1200000000 HC SEMI PRIVATE

## 2020-12-10 PROCEDURE — 6360000002 HC RX W HCPCS: Performed by: INTERNAL MEDICINE

## 2020-12-10 PROCEDURE — 97165 OT EVAL LOW COMPLEX 30 MIN: CPT

## 2020-12-10 PROCEDURE — 83605 ASSAY OF LACTIC ACID: CPT

## 2020-12-10 PROCEDURE — 93308 TTE F-UP OR LMTD: CPT

## 2020-12-10 PROCEDURE — 6370000000 HC RX 637 (ALT 250 FOR IP): Performed by: INTERNAL MEDICINE

## 2020-12-10 PROCEDURE — 2580000003 HC RX 258: Performed by: INTERNAL MEDICINE

## 2020-12-10 PROCEDURE — 82728 ASSAY OF FERRITIN: CPT

## 2020-12-10 PROCEDURE — 82306 VITAMIN D 25 HYDROXY: CPT

## 2020-12-10 PROCEDURE — 97530 THERAPEUTIC ACTIVITIES: CPT

## 2020-12-10 PROCEDURE — 36415 COLL VENOUS BLD VENIPUNCTURE: CPT

## 2020-12-10 PROCEDURE — 83615 LACTATE (LD) (LDH) ENZYME: CPT

## 2020-12-10 PROCEDURE — 83036 HEMOGLOBIN GLYCOSYLATED A1C: CPT

## 2020-12-10 PROCEDURE — 93010 ELECTROCARDIOGRAM REPORT: CPT | Performed by: INTERNAL MEDICINE

## 2020-12-10 RX ORDER — GUAIFENESIN/DEXTROMETHORPHAN 100-10MG/5
5 SYRUP ORAL EVERY 4 HOURS PRN
Qty: 120 ML | Refills: 0 | Status: SHIPPED | OUTPATIENT
Start: 2020-12-10 | End: 2020-12-20

## 2020-12-10 RX ORDER — ASPIRIN 81 MG/1
81 TABLET, CHEWABLE ORAL DAILY
Qty: 30 TABLET | Refills: 3 | Status: SHIPPED | OUTPATIENT
Start: 2020-12-11

## 2020-12-10 RX ADMIN — Medication 10 ML: at 09:26

## 2020-12-10 RX ADMIN — ASPIRIN 81 MG: 81 TABLET, CHEWABLE ORAL at 09:26

## 2020-12-10 RX ADMIN — ENOXAPARIN SODIUM 40 MG: 40 INJECTION SUBCUTANEOUS at 09:26

## 2020-12-10 RX ADMIN — ATORVASTATIN CALCIUM 20 MG: 10 TABLET, FILM COATED ORAL at 23:43

## 2020-12-10 RX ADMIN — OLANZAPINE 15 MG: 5 TABLET, FILM COATED ORAL at 23:43

## 2020-12-10 RX ADMIN — FAMOTIDINE 20 MG: 20 TABLET, FILM COATED ORAL at 09:26

## 2020-12-10 ASSESSMENT — PAIN SCALES - GENERAL
PAINLEVEL_OUTOF10: 0

## 2020-12-10 NOTE — PROGRESS NOTES
Spoke with pts daughter with use of telephone . Able to answer questions that the daughter had. Daughter communicated that the pt is nonverbal at baseline. Daughter also communicated that the pt does not use the restroom independently at baseline. Daughter satisfied with conversation.

## 2020-12-10 NOTE — CARE COORDINATION
Discharge Planning Assessment  RN discharge planner  Talked to family member- son- in law   discussed reason for admission, current living situation, and potential needs at the time of discharge    Demographics/Insurance verified Yes    Current type of dwelling:  [facility] STORM house    Patient from ECF/SW confirmed with: N/A      Living arrangements:   lives, daughter, son -in law and their children      Level of function/Support: Independent with ADLs    PCP: dr Molly Medina    Last Visit to PCP:    DME: walker    Active with any community resources/agencies/skilled home care: no  Family declining [facility] services , stating have enough family support     Medication compliance issues: Denies    Financial issues that could impact healthcare: no        Tentative discharge plan:  Home with family    Discussed and provided facilities of choice if transition to a skilled nursing facility is required at the time of discharge      Discussed with patient and/or family that on the day of discharge home tentative time of discharge will be between 10 AM and noon.     Transportation at the time of discharge:  Son in-law

## 2020-12-10 NOTE — PROGRESS NOTES
Pt climbing out of bed and going to window. Removing brief. RN called video  to communicate with pt.  explained that the pt needs to remain in bed and call for help when she needs to get up. Pt shook head \"No.\" Bed alarm disengaged per pt request via . Camera still in use to monitor pt. Will continue to monitor.

## 2020-12-10 NOTE — PROGRESS NOTES
[facility]    12/10/2020 5:36 PM        Name: Singh Carrillo . Admitted: 12/9/2020  Primary Care Provider: LIZABETH Barrera NP (Tel: [phone])    Brief Course:  Singh Carrillo is a 63 y.o. Pashto female with history of hypertension, type II DM, hyperlipidemia who presented to ED with complaints of chest pain, nausea, vomiting and cough which started yesterday. Spoke with patient's son-in-law over the phone and he reported that patient has been complaining of symptoms for the last 1 to 2 days. In ED, hemodynamically stable. Stable respiratory status on room air. Clinical exam notable for abdominal tenderness in ED. Troponin level negative. EKG showed nonspecific ST-T wave changes. CT abdomen/pelvis with no acute  acute abnormalities. Tested for COVID-19. Being admitted with chest pain rule out MI and suspected COVID-19 infection.     CC: Chest pain, nausea, vomiting, cough  Subjective: Patient up and walking in the room and working with physical therapy. Refusing to use . Denies pain.     Reviewed interval ancillary notes    Current Medications  OLANZapine (ZYPREXA) tablet 15 mg, Nightly  atorvastatin (LIPITOR) tablet 20 mg, Nightly  sodium chloride flush 0.9 % injection 10 mL, 2 times per day  sodium chloride flush 0.9 % injection 10 mL, PRN  acetaminophen (TYLENOL) tablet 650 mg, Q6H PRN    Or  acetaminophen (TYLENOL) suppository 650 mg, Q6H PRN  polyethylene glycol (GLYCOLAX) packet 17 g, Daily PRN  promethazine (PHENERGAN) tablet 12.5 mg, Q6H PRN    Or  ondansetron (ZOFRAN) injection 4 mg, Q6H PRN  aspirin chewable tablet 81 mg, Daily  perflutren lipid microspheres (DEFINITY) injection 1.65 mg, ONCE PRN  potassium chloride (KLOR-CON M) extended release tablet 40 mEq, PRN    Or  potassium bicarb-citric acid (EFFER-K) effervescent tablet 40 mEq, PRN    Or  potassium chloride 10 mEq/100 mL IVPB (Peripheral Line), PRN  magnesium sulfate 2 g in 50 mL IVPB premix, PRN  famotidine (PEPCID) tablet 20 mg, Daily  enoxaparin (LOVENOX) injection 40 mg, Daily  guaiFENesin-dextromethorphan (ROBITUSSIN DM) 100-10 MG/5ML syrup 5 mL, Q4H PRN  glucose (GLUTOSE) 40 % oral gel 15 g, PRN  dextrose 50 % IV solution, PRN  glucagon (rDNA) injection 1 mg, PRN  dextrose 5 % solution, PRN  insulin lispro (1 Unit Dial) 0-12 Units, TID WC  insulin lispro (1 Unit Dial) 0-6 Units, Nightly        Objective:  /60   Pulse 88   Temp 97.8 °F (36.6 °C) (Oral)   Resp 16   Wt 119 lb 11.2 oz (54.3 kg)   SpO2 91%   BMI 23.38 kg/m²     Intake/Output Summary (Last 24 hours) at 12/10/2020 1736  Last data filed at 12/9/2020 2130  Gross per 24 hour   Intake 240 ml   Output --   Net 240 ml      Wt Readings from Last 3 Encounters:   12/10/20 119 lb 11.2 oz (54.3 kg)   03/28/19 140 lb (63.5 kg)       General appearance: Shot Hebrew female, anxious appearing, not in distress  Cardiovascular: Regular rhythm, normal S1, S2. No murmur, gallop, rub. No edema in lower extremities  Respiratory: Decreased bibasilar breath, no wheeze, good inspiratory effort  Gastrointestinal: Abdomen soft, non-tender, not distended, normal bowel sounds  Musculoskeletal: No cyanosis in digits, neck supple  Neurology: Patient not following commands.   Unable to do a complete exam.  Psychiatry: Anxious appearing, not agitated  Skin: Warm, dry, normal turgor, no rash  Brisk capillary refill, peripheral pulses palpable     Labs and Tests:  CBC:   Recent Labs     12/09/20  0638 12/10/20  0609   WBC 5.7 3.2*   HGB 12.0 10.2*   PLT 90* 81*     BMP:    Recent Labs     12/09/20  0638 12/09/20  0757 12/10/20  0609   *  --  132*   K 5.6* 4.0 3.7   CL 98*  --  101   CO2 23  --  22   BUN 9  --  10   CREATININE 0.9  --  0.8   GLUCOSE 110*  --  99     Hepatic:   Recent Labs     12/09/20  0757 12/10/20  0609   AST 16 20   ALT 11 10   BILITOT 0.5 0.5   ALKPHOS 52 43     CT ABDOMEN PELVIS W IV CONTRAST Additional Contrast? None   Final Result   1. No acute abnormality. XR CHEST (2 VW)   Final Result   No evidence of acute process. Problem List  Principal Problem:    Cognitive dysfunction  Active Problems:    COVID-19    Pancytopenia ([address]  Resolved Problems:    Chest pain, rule out acute myocardial infarction       Assessment & Plan:     Chest pain: Likely atypical.  Currently resolved. Troponin levels negative x3. Nonspecific ST-T wave changes. Discussed with cardiology. No indication for further work-up at this time especially since she is Covid positive. Recommend outpatient cardiac stress test.  2D echo pending. Follow-up and discharge if no abnormalities on 2D echocardiogram.     Nausea, vomiting:   Tested positive for COVID-19. On Droplet plus precautions. Stable respiratory status on room air. No indication for steroids, IV remdesivir or convalescent plasma at this time. As needed antiemetics. Diet advance as tolerated.     Hyperkalemia:  Serum potassium level of 5.9 on admission. Down to 3.7 today morning.     Mild hyponatremia:  Serum sodium level 129. Likely due to decreased p.o. intake. Maintenance IV fluids. Serum sodium level of 132 today morning. DVT prophylaxis - Lovenox subcu daily  IV access - peripheral IV  Collier Catheter no  Diet: DIET GENERAL; Carb Control: 4 carb choices (60 gms)/meal; No Added Salt (3-4 GM); Vegetarian (Lacto-Ovo)  Code:Full Code  Disposition: 2D echocardiogram done later today. Patient could not be discharged since family was not home after 4 PM.  Discharge home in a.m. Family refused home care services.       Martín Casiano MD   12/10/2020 5:36 PM

## 2020-12-10 NOTE — CONSULTS
[facility]  [phone]      Chief Complaint   Patient presents with    Chest Pain     pt with cp, vomiting, and coughing since yesterday. History of Present Illness:  Crispin Martinez is a 63 y.o. patient who presented to the hospital with complaints of cough and malaise. The patient is unable to provide history. The history was obtained from the patient's daughter/family as the patient by video  (#[id number] Max Lopez) has cognitive issues at baseline. They report that she cannot walk up a flight of stairs at baseline, but does walk about the house. They state since 10/8/2020 the patient has been having cough, dizziness, and vomiting. They state that she has had constant chest pain and \"shooting pain all over the body and under the breast.\" They state that she did not have exertional chest pain or administration of nitroglycerin. She has a chronic cough. She has not been eating or drinking. No appetite. She was admitted and found to have covid 19. Cardiology was consulted for chest pain. History of dm, latent TB, hypertension and hyperlipidemia. Past Medical History:   has a past medical history of Diabetes mellitus ([address] Hyperlipidemia, and Hypertension. Surgical History:   has no past surgical history on file. Social History:   reports that she has never smoked. She has never used smokeless tobacco. She reports that she does not drink alcohol. Moved from [facility] in 2015    Family History:  No known family history of MI    Home Medications:  Were reviewed and are listed in nursing record. and/or listed below  Prior to Admission medications    Medication Sig Start Date End Date Taking?  Authorizing Provider   aspirin 81 MG chewable tablet Take 1 tablet by mouth daily 12/11/20  Yes Cheryl Fraser MD   guaiFENesin-dextromethorphan (ROBITUSSIN DM) 100-10 MG/5ML syrup Take 5 mLs by mouth every 4 hours as needed for Cough 12/10/20 12/20/20 Yes Cheryl Fraser MD cetirizine (ZYRTEC) 10 MG tablet Take 10 mg by mouth daily as needed for Allergies   Yes Historical Provider, MD   traZODone (DESYREL) 50 MG tablet Take 25 mg by mouth nightly as needed for Sleep   Yes Historical Provider, MD   dorzolamide (TRUSOPT) 2 % ophthalmic solution Place 1 drop into both eyes 3 times daily   Yes Historical Provider, MD   divalproex (DEPAKOTE) 250 MG DR tablet Take 500 mg by mouth daily   Yes Historical Provider, MD   hydrOXYzine (ATARAX) 25 MG tablet Take 25 mg by mouth 3 times daily as needed for Itching   Yes Historical Provider, MD   omeprazole (PRILOSEC) 40 MG delayed release capsule Take 40 mg by mouth daily   Yes Historical Provider, MD   tiotropium (SPIRIVA RESPIMAT) 2.5 MCG/ACT AERS inhaler Inhale 2 puffs into the lungs daily   Yes Historical Provider, MD   metFORMIN (GLUCOPHAGE) 1000 MG tablet Take 1,000 mg by mouth 2 times daily (with meals)    Yes Historical Provider, MD   lisinopril (PRINIVIL;ZESTRIL) 10 MG tablet Take 10 mg by mouth daily    Yes Historical Provider, MD   simvastatin (ZOCOR) 40 MG tablet Take 40 mg by mouth nightly   Yes Historical Provider, MD   OLANZapine (ZYPREXA) 20 MG tablet Take 20 mg by mouth nightly    Yes Historical Provider, MD        Current Medications:  Current Facility-Administered Medications   Medication Dose Route Frequency Provider Last Rate Last Dose    OLANZapine (ZYPREXA) tablet 15 mg  15 mg Oral Nightly Regan Archuleta MD   15 mg at 12/09/20 2157    atorvastatin (LIPITOR) tablet 20 mg  20 mg Oral Nightly Regan Archuleta MD   20 mg at 12/09/20 2157    sodium chloride flush 0.9 % injection 10 mL  10 mL Intravenous 2 times per day Regan Archuleta MD   10 mL at 12/10/20 0926    sodium chloride flush 0.9 % injection 10 mL  10 mL Intravenous PRN Regan Archuleta MD        acetaminophen (TYLENOL) tablet 650 mg  650 mg Oral Q6H PRN Regan Archuleta MD        Or    acetaminophen (TYLENOL) suppository 650 mg  650 mg Rectal Q6H PRN Regan Archuleta MD  polyethylene glycol (GLYCOLAX) packet 17 g  17 g Oral Daily PRN Adri Vaughn MD        promethazine (PHENERGAN) tablet 12.5 mg  12.5 mg Oral Q6H PRN Adri Vaughn MD        Or    ondansetron (ZOFRAN) injection 4 mg  4 mg Intravenous Q6H PRN Adri Vaughn MD        aspirin chewable tablet 81 mg  81 mg Oral Daily Adri Vaughn, MD   81 mg at 12/10/20 5390    perflutren lipid microspheres (DEFINITY) injection 1.65 mg  1.5 mL Intravenous ONCE PRN Adri Vaughn MD        potassium chloride (KLOR-CON M) extended release tablet 40 mEq  40 mEq Oral PRN Adri Block, MD        Or    potassium bicarb-citric acid (EFFER-K) effervescent tablet 40 mEq  40 mEq Oral PRN Adri Block, MD        Or    potassium chloride 10 mEq/100 mL IVPB (Peripheral Line)  10 mEq Intravenous PRN Adrilety Vaughn MD        magnesium sulfate 2 g in 50 mL IVPB premix  2 g Intravenous PRN Adrilety Vaughn MD        famotidine (PEPCID) tablet 20 mg  20 mg Oral Daily Adri Vaughn MD   20 mg at 12/10/20 0926    enoxaparin (LOVENOX) injection 40 mg  40 mg Subcutaneous Daily Adri Vaughn MD   40 mg at 12/10/20 0926    guaiFENesin-dextromethorphan (ROBITUSSIN DM) 100-10 MG/5ML syrup 5 mL  5 mL Oral Q4H PRN Adri Vaughn MD        glucose (GLUTOSE) 40 % oral gel 15 g  15 g Oral PRN Adri Vaughn MD        dextrose 50 % IV solution  12.5 g Intravenous PRN Adri Vaughn MD        glucagon (rDNA) injection 1 mg  1 mg Intramuscular PRN Adri Vaughn MD        dextrose 5 % solution  100 mL/hr Intravenous PRN Adri Vaughn MD        insulin lispro (1 Unit Dial) 0-12 Units  0-12 Units Subcutaneous TID WC Adri Vaughn MD        insulin lispro (1 Unit Dial) 0-6 Units  0-6 Units Subcutaneous Nightly Adri Vaughn MD            Allergies:  Patient has no known allergies.      Review of Systems:     · Unable to obtain history due to patient condition   ·     Physical Examination:    Vitals:    12/10/20 0900   BP: 110/60   Pulse: 88 Resp: 16   Temp: 97.8 °F (36.6 °C)   SpO2: 91%    Weight: 119 lb 11.2 oz (54.3 kg)         General Appearance:  Alert, cooperative, unwilling to answer questions, no acute distress   Head:  Normocephalic, without obvious abnormality, atraumatic   Eyes:  PERRL, conjunctiva/corneas clear       Nose: Nares normal, no drainage or sinus tenderness   Throat: Lips, mucosa, and tongue normal   Neck: Supple, symmetrical, trachea midline, no adenopathy, thyroid: not enlarged, symmetric, no tenderness/mass/nodules, no carotid bruit or JVD       Lungs:   Clear to auscultation bilaterally, respirations unlabored   Chest Wall:  No tenderness or deformity   Heart:  Regular rate and rhythm, S1, S2 normal, no murmur, rub or gallop   Abdomen:   Soft, non-tender, bowel sounds active all four quadrants,  no masses, no organomegaly           Extremities: Extremities normal, atraumatic, no cyanosis or edema   Pulses: 2+ and symmetric   Skin: Skin color, texture, turgor normal, no rashes or lesions   Pysch: Unable to assess   Neurologic: Moving all extremities        Labs  CBC:   Lab Results   Component Value Date    WBC 3.2 12/10/2020    RBC 3.17 12/10/2020    HGB 10.2 12/10/2020    HCT 30.4 12/10/2020    MCV 95.9 12/10/2020    RDW 11.8 12/10/2020    PLT 81 12/10/2020     CMP:    Lab Results   Component Value Date     12/10/2020    K 3.7 12/10/2020     12/10/2020    CO2 22 12/10/2020    BUN 10 12/10/2020    CREATININE 0.8 12/10/2020    GFRAA >60 12/10/2020    AGRATIO 1.0 12/10/2020    LABGLOM >60 12/10/2020    GLUCOSE 99 12/10/2020    PROT 7.1 12/10/2020    CALCIUM 8.6 12/10/2020    BILITOT 0.5 12/10/2020    ALKPHOS 43 12/10/2020    AST 20 12/10/2020    ALT 10 12/10/2020     PT/INR:  No results found for: PTINR  Lab Results   Component Value Date    TROPONINI <0.01 12/09/2020       EKG:  I have reviewed EKG with the following interpretation:  Impression: normal sinus rhythm, no st-t wave changes    Echo: none  Stress: none  Cath: none  MRI/EP/Other: none    Old notes reviewed  Telemetry reviewd  Ekg personally reviewed  Chest xray personally reviewed    Medications and labs reviewed  moderate complexity/medical decision making due to extensive data review, extensive history review, independent review of data  moderate risk due to acute illness, evaluation of drug-drug interactions, medication management and diagnostic interventions    Assessment  Patient Active Problem List   Diagnosis    COVID-19         Plan:    I had the opportunity to review the clinical symptoms and presentation of Lincoln Unger. Assessment/Plan:  1. Covid 19 infection  - likely etiology of chest pain and shortness of breath  - troponin negative, ruled out MI  - Differential: covid 19 pleurisy, gerd, pe, msk, ischemia  Plan:  - consider CT chest to evaluate etiology of chest pain  - recommend supportive care of cvoid-19 infection  - unable to stress test as covid-19 positive  - recommend echocardiogram to evaluate for EF and structural function. If okay, can pursue outpatient ischemic evaluation when covid-19 quarantine has finished     2. Cognitive dysfunction  - unclear etiology  Plan:  - follow up with PCP    3. Pancytopenia  - new problem  Plan:  - follow up per primary       I will address the patient's cardiac risk factors and adjusted pharmacologic treatment as needed. In addition, I have reinforced the need for patient directed risk factor modification. Tobacco use was discussed with the patient and educated on the negative effects. I have asked the patient to not utilize these agents. \     All questions and concerns were addressed to the patient/family. Alternatives to my treatment were discussed. The note was completed using EMR. Every effort was made to ensure accuracy; however, inadvertent computerized transcription errors may be present.   Suzy Figueroa MD 12/10/2020 3:07 PM

## 2020-12-10 NOTE — CARE COORDINATION
Family declines [facility] services son in-law will  patient.

## 2020-12-10 NOTE — PROGRESS NOTES
Care;Transfer Training  Patient Education: pt nodding in agreeance but would benefit from continued education  Barriers to Learning: language  REQUIRES OT FOLLOW UP: Yes  Activity Tolerance  Activity Tolerance: Patient Tolerated treatment well  Activity Tolerance: limited by language barrier  Safety Devices  Safety Devices in place: Yes  Type of devices: All fall risk precautions in place; Patient at risk for falls; Left in bed;Bed alarm in place;Call light within reach;Nurse notified           Patient Diagnosis(es): The primary encounter diagnosis was Chest pain, unspecified type. Diagnoses of Abnormal EKG, Suspected COVID-19 virus infection, and Hyponatremia were also pertinent to this visit. has a past medical history of Diabetes mellitus ([address] Hyperlipidemia, and Hypertension. has no past surgical history on file. Treatment Diagnosis: Decreased ADL status, functional mobility, and functional transfers d/t Chest pain, rule out acute myocardial infarction      Restrictions  Restrictions/Precautions  Restrictions/Precautions: Fall Risk(high fall risk)  Position Activity Restriction  Other position/activity restrictions: Chino Martinez is a 63 y.o. female who presented because of vomiting, cough, chest pain. She has abnormality noted on her EKG. She does have a congested sounding cough and no evidence for pneumonia at this point time. Covid swab was positive. Subjective   General  Chart Reviewed: Yes  Additional Pertinent Hx: PMH: Diabetes mellitus ([facility] [address] Hyperlipidemia, Hypertension  Family / Caregiver Present: No  Referring Practitioner: Mike Bernabe MD  Diagnosis: Chest pain, rule out acute myocardial infarction  Subjective  Subjective: Pt supine in bed upon arrival. per chart pt is nonverbal at baseline and understands Turkish. Pt agreeable to OT/PT evaluation with head nod.   Patient Currently in Pain: Denies  Vital Signs  Patient Currently in Pain: Denies    Social/Functional History  Social/Functional History  Lives With: Daughter  Additional Comments: Pt has 24 hour care. Attempted to call daughter twice but received a busy signal.       Objective   Vision: Within Functional Limits  Hearing: Within functional limits          Balance  Sitting Balance: Stand by assistance  Standing Balance: Contact guard assistance  Standing Balance  Time: 10 minutes  Activity: ADL completion, functional mobility, and functional transfers  Comment: hand held assist and no LOB present  Functional Mobility  Functional - Mobility Device: Other(hand held assist)  Activity: To/from bathroom  Assist Level: Contact guard assistance  Functional Mobility Comments: CGA to ambulate to<>from bathroom with hand held assistance. Pt slightly unstedy but no LOB present and increased steadiness present with hand held assist being provided. Toilet Transfers  Toilet - Technique: Ambulating  Equipment Used: Standard toilet  Toilet Transfer: Contact guard assistance  Toilet Transfers Comments: hand held assistance provided  ADL  Grooming: Stand by assistance(hand hygiene and hair combing completed in [place] at sink)  LE Dressing: Maximum assistance(to dhruv brief)  Toileting: Minimal assistance(min(A) to bring brief down)  Additional Comments: language barrier was limiting during session, unable to get a baseline level function for ADLs completion, however, MD stated in room that pt has 24 assistance at home if needed.   Tone RUE  RUE Tone: Normotonic  Tone LUE  LUE Tone: Normotonic  Coordination  Movements Are Fluid And Coordinated: Yes     Bed mobility  Supine to Sit: Stand by assistance  Sit to Supine: Stand by assistance  Scooting: Stand by assistance  Transfers  Sit to stand: Contact guard assistance  Stand to sit: Contact guard assistance  Transfer Comments: hand held assistance provided     Cognition  Cognition Comment: pt able to follow commands with visual cueing (pointing) but unable to formally assess cognition d/t language barrier  Perception  Overall Perceptual Status: WFL     Sensation  Overall Sensation Status: WFL        LUE AROM (degrees)  LUE AROM : WFL  Left Hand AROM (degrees)  Left Hand AROM: WFL  RUE AROM (degrees)  RUE AROM : WFL  Right Hand AROM (degrees)  Right Hand AROM: WFL                      Plan   Plan  Times per week: 1-2x/wk  Times per day: Daily  Current Treatment Recommendations: Balance Training, Functional Mobility Training, Endurance Training, Self-Care / ADL, Safety Education & Training    G-Code     OutComes Score                                                  AM-PAC Score        AM-[facility] Inpatient Daily Activity Raw Score: 19 (12/10/20 1340)  AM-PAC Inpatient ADL T-Scale Score : 40.22 (12/10/20 1340)  ADL Inpatient CMS 0-100% Score: 42.8 (12/10/20 1340)  ADL Inpatient CMS G-Code Modifier : CK (12/10/20 1340)    Goals  Short term goals  Time Frame for Short term goals: d/c  Short term goal 1: Pt will complete toileting with supervision  Short term goal 2: Pt will complete UB/LB ADLs with supervision  Short term goal 3: Pt will complete functional mobility to<>from bathroom with LRAD and SBA  Short term goal 4: pt will complete functional transfer to ADL surface with SBA  Long term goals  Time Frame for Long term goals : LTG=STG  Patient Goals   Patient goals : pt nonverbal and unable to state       Therapy Time   Individual Concurrent Group Co-treatment   Time In 1122         Time Out 1148         Minutes 26         Timed Code Treatment Minutes: [id number] [facility] [employer], [address] [place] [id number]

## 2020-12-10 NOTE — PROGRESS NOTES
RN contacted  services to attempt to communicate with pt. Pt nodded head when speaking with interpretor, but would not make a verbal response. RN had interpretor introduce RN by name to the pt as well as explain to the pt what the RN would be doing with the pt prior to performing such as vital signs, assessment, and checking pts breif. Pt was calm and cooperative. Pt does not appear to be in pain, calm expressions and sleeping after morning assessment and medication. RN will continue to monitor and use  as appropriate.

## 2020-12-10 NOTE — DISCHARGE SUMMARY
indication for further work-up at this time especially since she is COVID-19 positive. Recommend outpatient cardiac stress test.  2D echo showed no acute abnormalities, LVEF of 60%, no pericardial effusion or thickening. Nausea, vomiting: likely from COVID-19. Resolved prior to admission. Tested positive for COVID-19. On Droplet plus precautions.  Stable respiratory status on room air.  No indication for steroids, IV remdesivir or convalescent plasma at this time.  As needed antiemetics.  Diet advanced and tolerated.   Hyperkalemia: Serum potassium level of 5.9 on admission.  resolved prior to admission.   Mild hyponatremia: Serum sodium level 129.  Likely due to decreased p.o. intake.  Maintenance IV fluids. Serum sodium level at 131 on the day of discharge.      Consults. IP CONSULT TO CARDIOLOGY    Physical examination on discharge day. /60   Pulse 88   Temp 97.8 °F (36.6 °C) (Oral)   Resp 16   Wt 119 lb 11.2 oz (54.3 kg)   SpO2 91%   BMI 23.38 kg/m²   General appearance: Shot frail Macedonian female, anxious appearing, not in distress  Cardiovascular: Regular rhythm, normal S1, S2. No murmur, gallop, rub. No edema in lower extremities  Respiratory: Decreased bibasilar breath, no wheeze, good inspiratory effort  Gastrointestinal: Abdomen soft, non-tender, not distended, normal bowel sounds  Musculoskeletal: No cyanosis in digits, neck supple  Neurology: Patient not following commands.  Unable to do a complete exam.  Psychiatry: Anxious appearing, not agitated  Skin: Warm, dry, normal turgor, no rash  Brisk capillary refill, peripheral pulses palpable        Condition at time of discharge stable    Medication instructions provided to patient at discharge.      Medication List      START taking these medications    aspirin 81 MG chewable tablet  Take 1 tablet by mouth daily  Start taking on:  December 11, 2020     guaiFENesin-dextromethorphan 100-10 MG/5ML syrup  Commonly known as:  ROBITUSSIN

## 2020-12-10 NOTE — PROGRESS NOTES
Physical Therapy    Facility/Department: [address] NURSING  Initial Assessment    NAME: Lincoln Unger  : 1955  MRN: [id number]    Date of Service: 12/10/2020    Discharge Recommendations: Satish Sommer scored a 18/24 on the AM-PAC short mobility form. Current research shows that an AM-PAC score of 18 or greater is typically associated with a discharge to the patient's home setting. Based on the patient's AM-PAC score and their current functional mobility deficits, it is recommended that the patient have 2-3 sessions per week of Physical Therapy at d/c to increase the patient's independence. At this time, this patient demonstrates the endurance and safety to discharge home with home services and a follow up treatment frequency of 2-3x/wk. Please see assessment section for further patient specific details. If patient discharges prior to next session this note will serve as a discharge summary. Please see below for the latest assessment towards goals. HOME HEALTH CARE: LEVEL 1 STANDARD    - Initial home health evaluation to occur within 24-48 hours, in patient home   - Therapy to evaluate with goal of regaining prior level of functioning   - Therapy to evaluate if patient has [address] needs for personal care       PT Equipment Recommendations  Equipment Needed: Yes  Mobility Devices: Cindra Italian: Rolling    Assessment   Body structures, Functions, Activity limitations: Decreased balance;Decreased endurance;Decreased cognition  Assessment: The pt presents with decreased dynamic standing balance and activity tolerance. Unable to obtain pt's PLOF but the pt does have 24 hour care from family available.   Prognosis: Good  Decision Making: Low Complexity  PT Education: PT Role  Patient Education: pt was unable to understand PT/OT due to language barrier, RN stated the interpretor did not appear to be helpful  Barriers to Learning: language, cognition  REQUIRES PT FOLLOW UP: Yes  Activity Tolerance  Activity Tolerance: Patient Tolerated treatment well;Treatment limited secondary to medical complications (free text)  Activity Tolerance: limited by language barrier       Patient Diagnosis(es): The primary encounter diagnosis was Chest pain, unspecified type. Diagnoses of Abnormal EKG, Suspected COVID-19 virus infection, and Hyponatremia were also pertinent to this visit. has a past medical history of Diabetes mellitus ([address] Hyperlipidemia, and Hypertension. has no past surgical history on file. Restrictions  Restrictions/Precautions  Restrictions/Precautions: Fall Risk(high fall risk)  Position Activity Restriction  Other position/activity restrictions: Lisa Jones is a 63 y.o. female who presented because of vomiting, cough, chest pain. She has abnormality noted on her EKG. She does have a congested sounding cough and no evidence for pneumonia at this point time. Covid swab was positive. Vision/Hearing  Vision: Within Functional Limits  Hearing: Within functional limits     Subjective  General  Chart Reviewed: Yes  Family / Caregiver Present: No  Diagnosis: chest pain  Follows Commands: Within Functional Limits  Other (Comment): pt followed tactile cues, RN stated pt is nonverbal at baseline, pt does not speak English but was unable to speak with an interpretor  General Comment  Comments: pt was supine in bed upon PT arrival  Subjective  Subjective: pt denied pain  Pain Screening  Patient Currently in Pain: Denies       Orientation  Orientation  Overall Orientation Status: (VANCE)  Social/Functional History  Social/Functional History  Lives With: Daughter  Additional Comments: Pt has 24 hour care.   Attempted to call daughter twice but received a busy signal.    Objective  AROM RLE (degrees)  RLE AROM: WFL  AROM LLE (degrees)  LLE AROM : WFL  Strength RLE  Strength RLE: WFL  Strength LLE  Strength LLE: WFL  Tone RLE  RLE Tone: Normotonic  Tone LLE  LLE Tone: Normotonic  Motor Control  Gross Motor?: WFL  Sensation  Overall Sensation Status: WFL  Bed mobility  Supine to Sit: Stand by assistance  Sit to Supine: Stand by assistance  Scooting: Stand by assistance  Transfers  Sit to Stand: Contact guard assistance  Stand to sit: Contact guard assistance  Ambulation  Ambulation?: Yes  Ambulation 1  Surface: level tile  Device: Hand-Held Assist  Assistance: Contact guard assistance  Quality of Gait: slightly unsteady  Gait Deviations: Slow Lorraine;Decreased step length  Distance: 15' to toilet, 20' in room  Comments: pt stood at the sink for grooming for 4 minutes with SBA for balance, pt required assistance with donning/doffing a brief for toileting     Balance  Posture: Good  Sitting - Static: Good;-  Sitting - Dynamic: Fair  Standing - Static: Good;-(SBA)  Standing - Dynamic: Fair(CGA with hand-held assist)        Plan   Plan  Times per week: 1-2  Current Treatment Recommendations: Strengthening, Balance Training, Gait Training, Transfer Training, Endurance Training, Neuromuscular Re-education, Safety Education & Training  Safety Devices  Type of devices: All fall risk precautions in place, Bed alarm in place, Call light within reach, Nurse notified, Razia Prime in use, Left in bed(RN aware PT/OT in room)      AM-PAC Score  AM-PAC Inpatient Mobility Raw Score : 18 (12/10/20 1335)  AM-PAC Inpatient T-Scale Score : 43.63 (12/10/20 1335)  Mobility Inpatient CMS 0-100% Score: 46.58 (12/10/20 1335)  Mobility Inpatient CMS G-Code Modifier : CK (12/10/20 1335)          Goals  Short term goals  Time Frame for Short term goals:  To be met prior to Dc  Short term goal 1: Pt will perform bed mobility with mod I  Short term goal 2: Pt will perform sit to/from stand with supervision  Short term goal 3: Pt will ambulate 76' with AAD and SBA  Patient Goals   Patient goals : Carmelo Malagon MD told PT/OT the pt's daughter and son-in-law plan to take the pt home       Therapy Time   Individual Concurrent Group Co-treatment   Time In 1122         Time Out 1148         Minutes 26         Timed Code Treatment Minutes: [address], [address] [facility] DPT [id number]

## 2020-12-11 VITALS
BODY MASS INDEX: 22.93 KG/M2 | TEMPERATURE: 98.2 F | RESPIRATION RATE: 20 BRPM | HEART RATE: 83 BPM | WEIGHT: 117.4 LBS | DIASTOLIC BLOOD PRESSURE: 87 MMHG | OXYGEN SATURATION: 94 % | SYSTOLIC BLOOD PRESSURE: 129 MMHG

## 2020-12-11 LAB
A/G RATIO: 1 (ref 1.1–2.2)
ALBUMIN SERPL-MCNC: 3.6 G/DL (ref 3.4–5)
ALP BLD-CCNC: 46 U/L (ref 40–129)
ALT SERPL-CCNC: 14 U/L (ref 10–40)
ANION GAP SERPL CALCULATED.3IONS-SCNC: 14 MMOL/L (ref 3–16)
APTT: 44.3 SEC (ref 24.2–36.2)
AST SERPL-CCNC: 34 U/L (ref 15–37)
BASOPHILS ABSOLUTE: 0 K/UL (ref 0–0.2)
BASOPHILS RELATIVE PERCENT: 0.2 %
BILIRUB SERPL-MCNC: 0.5 MG/DL (ref 0–1)
BUN BLDV-MCNC: 17 MG/DL (ref 7–20)
CALCIUM SERPL-MCNC: 8.6 MG/DL (ref 8.3–10.6)
CHLORIDE BLD-SCNC: 99 MMOL/L (ref 99–110)
CO2: 18 MMOL/L (ref 21–32)
CREAT SERPL-MCNC: 0.9 MG/DL (ref 0.6–1.2)
D DIMER: 779 NG/ML DDU (ref 0–229)
EOSINOPHILS ABSOLUTE: 0 K/UL (ref 0–0.6)
EOSINOPHILS RELATIVE PERCENT: 0 %
ESTIMATED AVERAGE GLUCOSE: 128.4 MG/DL
FIBRINOGEN: 259 MG/DL (ref 200–397)
GFR AFRICAN AMERICAN: >60
GFR NON-AFRICAN AMERICAN: >60
GLOBULIN: 3.7 G/DL
GLUCOSE BLD-MCNC: 101 MG/DL (ref 70–99)
HBA1C MFR BLD: 6.1 %
HCT VFR BLD CALC: 32 % (ref 36–48)
HEMOGLOBIN: 10.6 G/DL (ref 12–16)
INR BLD: 1.21 (ref 0.86–1.14)
LYMPHOCYTES ABSOLUTE: 1.2 K/UL (ref 1–5.1)
LYMPHOCYTES RELATIVE PERCENT: 34.7 %
MCH RBC QN AUTO: 31.6 PG (ref 26–34)
MCHC RBC AUTO-ENTMCNC: 33 G/DL (ref 31–36)
MCV RBC AUTO: 95.8 FL (ref 80–100)
MONOCYTES ABSOLUTE: 0.7 K/UL (ref 0–1.3)
MONOCYTES RELATIVE PERCENT: 19.5 %
NEUTROPHILS ABSOLUTE: 1.6 K/UL (ref 1.7–7.7)
NEUTROPHILS RELATIVE PERCENT: 45.6 %
PDW BLD-RTO: 12 % (ref 12.4–15.4)
PLATELET # BLD: 73 K/UL (ref 135–450)
PMV BLD AUTO: 10.3 FL (ref 5–10.5)
POTASSIUM REFLEX MAGNESIUM: 3.7 MMOL/L (ref 3.5–5.1)
PROTHROMBIN TIME: 14.1 SEC (ref 10–13.2)
RBC # BLD: 3.35 M/UL (ref 4–5.2)
SODIUM BLD-SCNC: 131 MMOL/L (ref 136–145)
TOTAL PROTEIN: 7.3 G/DL (ref 6.4–8.2)
WBC # BLD: 3.5 K/UL (ref 4–11)

## 2020-12-11 PROCEDURE — 80053 COMPREHEN METABOLIC PANEL: CPT

## 2020-12-11 PROCEDURE — 36415 COLL VENOUS BLD VENIPUNCTURE: CPT

## 2020-12-11 PROCEDURE — 85610 PROTHROMBIN TIME: CPT

## 2020-12-11 PROCEDURE — 85025 COMPLETE CBC W/AUTO DIFF WBC: CPT

## 2020-12-11 PROCEDURE — 85730 THROMBOPLASTIN TIME PARTIAL: CPT

## 2020-12-11 PROCEDURE — 85379 FIBRIN DEGRADATION QUANT: CPT

## 2020-12-11 PROCEDURE — 85384 FIBRINOGEN ACTIVITY: CPT

## 2020-12-11 NOTE — PROGRESS NOTES
Data- discharge order received, pt verbalized agreement to discharge, disposition to previous residence, no needs for HHC/DME. Action- discharge instructions prepared and given to pt and family, pt verbalized understanding. Medication information packet given r/t NEW and/or CHANGED prescriptions emphasizing name/purpose/side effects, pt verbalized understanding. Discharge instruction summary: Diet- general, Activity- at tolerated, in quarantine, Primary Care Physician as follows: LIZABETH Martínez - CARLO [phone] f/u appointment in one week, immunizations reviewed and updated, prescription medications filled n/a. Inpatient surgical procedure precautions reviewed:      Response- Pt belongings gathered, IV removed. Disposition is home (no HHC/DME needs), transported with daughter and son, taken to lobby via w/c w/ RN, no complications.

## 2020-12-11 NOTE — PROGRESS NOTES
Resting in bed, refused vitals, accucheck, and assessment. Ambulates independently, camera in room. Call light in reach.

## 2020-12-11 NOTE — PROGRESS NOTES
Allowed assessment, accucheck, and med pass. Meds passed whole in applesauce. Assisted to bathroom and back to bed. Refusing to wear tele. Camera engaged.

## 2020-12-12 ENCOUNTER — CARE COORDINATION (OUTPATIENT)
Dept: CASE MANAGEMENT | Age: 65
End: 2020-12-12

## 2020-12-12 NOTE — CARE COORDINATION
Radha Resendiziredo [facility] Initial Outreach    Call within 2 business days of discharge: Yes    Patient: Chino Martinez Patient : 1955   MRN: <[id number]>  Discharge Date: 20   RARS: Readmission Risk Score: 17      Last Discharge [facility]       Complaint Diagnosis Description Type Department Provider    20 Chest Pain Chest pain, unspecified type . .. ED to Hosp-Admission (Discharged) (ADMITTED) Yuri Staples MD; [facility]. .. Spoke with: son and DIL    Non-face-to-face services provided:  Obtained and reviewed discharge summary and/or continuity of care documents    Challenges to be reviewed by the provider   Additional needs identified to be addressed with provider No    COVID-19 Detected on 2020. Patient informed of results, if available? Yes       Method of communication with provider : none    Advance Care Planning:   Does patient have an Advance Directive:  not on file. Was this a readmission? No  Patient stated reason for admission: chest pain  Patients top risk factors for readmission: medical condition    Care Transition Nurse (CTN) contacted the family by telephone to perform post hospital discharge assessment. Verified name and  with family as identifiers. Provided introduction to self, and explanation of the CTN role. CTN reviewed discharge instructions, medical action plan and red flags with family who verbalized understanding. Family given an opportunity to ask questions and does not have any further questions or concerns at this time. Were discharge instructions available to patient? Yes. Reviewed appropriate site of care based on symptoms and resources available to patient including: PCP. The patient agrees to contact the PCP office for questions related to their healthcare. Medication reconciliation was performed with family, who verbalizes understanding of administration of home medications.      Covid Risk Education    Patient has following COVID-19 risk factors: diabetes. Education provided regarding infection prevention, and signs and symptoms of COVID-19 and when to seek medical attention with family who verbalized understanding. .     Symptoms reviewed with family who verbalized the following symptoms: no new symptoms. Due to no new or worsening symptoms encounter was not routed to provider for escalation. Patient/family/caregiver given information for [employer] and agrees to enroll no  Patient's preferred e-mail: NA    Discussed follow-up appointments. If no appointment was previously scheduled, appointment scheduling offered: scheduled. Is follow up appointment scheduled within 7 days of discharge? Yes  Non-[facility] follow up appointment(s): NA    Plan for follow-up call in 5-7 days based on severity of symptoms and risk factors. Plan for next call: symptom management-COVID-19    Accessed [employer] Phone  for Manuel Perry (#[phone]) for outreach to patient. Per daughter and KAYLA, patient is [facility] and unable to speak on phone. Daughter confirmed taking Tylenol prn, eating better and no worsening symptoms. Daughter wants additional info on why ASA 81mg was prescribed. Encouraged her to discuss with APRN at follow up appt below. Family asking about COVID test sites. Provided them with info for [employer] and MH sites/hours known to CTN. They deny additional needs/concerns at this time.       Follow Up  Future Appointments   Date Time Provider Elen Newman   12/21/2020  2:30 PM LIZABETH Sanchez - CNP [facility]       Lakeisha Brian RN  Care Transition Nurse  [phone] mobile

## 2020-12-13 LAB
BLOOD CULTURE, ROUTINE: NORMAL
CULTURE, BLOOD 2: NORMAL

## 2020-12-18 ENCOUNTER — CARE COORDINATION (OUTPATIENT)
Dept: CASE MANAGEMENT | Age: 65
End: 2020-12-18

## 2020-12-18 NOTE — CARE COORDINATION
Patient resolved from the Care Transitions episode on 12/18/20  Discussed COVID-19 related testing which was available at this time. Test results were positive. Patient informed of results, if available? Yes    Patient/family has been provided the following resources and education related to COVID-19:                         Signs, symptoms and red flags related to COVID-19            CDC exposure and quarantine guidelines            [employer] exposure contact - [phone]            Contact for their local Department of Health                 Patient currently reports per  that the following symptoms have improved:  fever, fatigue, pain or aching joints, cough, shortness of breath, confusion or unusual change in mental status and no new/worsening symptoms     No further outreach scheduled with this CTN/ACM. Episode of Care resolved. Patient has this CTN/ACM contact information if future needs arise.